# Patient Record
Sex: MALE | Race: WHITE | NOT HISPANIC OR LATINO | Employment: OTHER | URBAN - METROPOLITAN AREA
[De-identification: names, ages, dates, MRNs, and addresses within clinical notes are randomized per-mention and may not be internally consistent; named-entity substitution may affect disease eponyms.]

---

## 2017-01-12 ENCOUNTER — TELEPHONE (OUTPATIENT)
Dept: NEUROLOGY | Facility: CLINIC | Age: 77
End: 2017-01-12

## 2017-01-12 DIAGNOSIS — G40.209 LOCALIZATION-RELATED PARTIAL EPILEPSY WITH COMPLEX PARTIAL SEIZURES (H): Primary | ICD-10-CM

## 2017-01-12 RX ORDER — CLONAZEPAM 0.5 MG/1
TABLET ORAL
Qty: 270 TABLET | Refills: 1 | Status: SHIPPED | OUTPATIENT
Start: 2017-01-12 | End: 2017-08-09

## 2017-02-22 ENCOUNTER — APPOINTMENT (RX ONLY)
Dept: URBAN - METROPOLITAN AREA CLINIC 121 | Facility: CLINIC | Age: 77
Setting detail: DERMATOLOGY
End: 2017-02-22

## 2017-02-22 DIAGNOSIS — Z85.828 PERSONAL HISTORY OF OTHER MALIGNANT NEOPLASM OF SKIN: ICD-10-CM

## 2017-02-22 DIAGNOSIS — Z86.006 PERSONAL HISTORY OF MELANOMA IN-SITU: ICD-10-CM

## 2017-02-22 DIAGNOSIS — D485 NEOPLASM OF UNCERTAIN BEHAVIOR OF SKIN: ICD-10-CM

## 2017-02-22 PROBLEM — I48.91 UNSPECIFIED ATRIAL FIBRILLATION: Status: ACTIVE | Noted: 2017-02-22

## 2017-02-22 PROBLEM — Z85.820 PERSONAL HISTORY OF MALIGNANT MELANOMA OF SKIN: Status: ACTIVE | Noted: 2017-02-22

## 2017-02-22 PROBLEM — G40.909 EPILEPSY, UNSPECIFIED, NOT INTRACTABLE, WITHOUT STATUS EPILEPTICUS: Status: ACTIVE | Noted: 2017-02-22

## 2017-02-22 PROBLEM — L57.0 ACTINIC KERATOSIS: Status: ACTIVE | Noted: 2017-02-22

## 2017-02-22 PROBLEM — E78.5 HYPERLIPIDEMIA, UNSPECIFIED: Status: ACTIVE | Noted: 2017-02-22

## 2017-02-22 PROBLEM — L55.1 SUNBURN OF SECOND DEGREE: Status: ACTIVE | Noted: 2017-02-22

## 2017-02-22 PROBLEM — D48.5 NEOPLASM OF UNCERTAIN BEHAVIOR OF SKIN: Status: ACTIVE | Noted: 2017-02-22

## 2017-02-22 PROCEDURE — ? COUNSELING

## 2017-02-22 PROCEDURE — ? OBSERVATION

## 2017-02-22 PROCEDURE — 11100: CPT

## 2017-02-22 PROCEDURE — ? BIOPSY BY SHAVE METHOD

## 2017-02-22 PROCEDURE — 99203 OFFICE O/P NEW LOW 30 MIN: CPT | Mod: 25

## 2017-02-22 ASSESSMENT — LOCATION DETAILED DESCRIPTION DERM
LOCATION DETAILED: LEFT POSTERIOR SHOULDER
LOCATION DETAILED: RIGHT MEDIAL MALAR CHEEK
LOCATION DETAILED: LEFT SUPERIOR CENTRAL MALAR CHEEK

## 2017-02-22 ASSESSMENT — LOCATION SIMPLE DESCRIPTION DERM
LOCATION SIMPLE: LEFT SHOULDER
LOCATION SIMPLE: LEFT CHEEK
LOCATION SIMPLE: RIGHT CHEEK

## 2017-02-22 ASSESSMENT — LOCATION ZONE DERM
LOCATION ZONE: FACE
LOCATION ZONE: ARM

## 2017-02-22 NOTE — PROCEDURE: OBSERVATION
Size Of Lesion In Cm (Optional): 0
Detail Level: Simple
Body Location Override (Optional - Billing Will Still Be Based On Selected Body Map Location If Applicable): Right side of face

## 2017-02-22 NOTE — HPI: SKIN LESION
How Severe Is Your Skin Lesion?: mild
Has Your Skin Lesion Been Treated?: not been treated
Is This A New Presentation, Or A Follow-Up?: Skin Lesion
Additional History: Melanoma treated 5 yrs ago

## 2017-02-22 NOTE — PROCEDURE: BIOPSY BY SHAVE METHOD
Notification Instructions: Patient will be notified of biopsy results. However, patient instructed to call the office if not contacted within 2 weeks.
Billing Type: [employer]
Hemostasis: Josh's
Biopsy Method: Personna blade
Lab: [facility]
Destruction After The Procedure: No
Electrodesiccation And Curettage Text: The wound bed was treated with electrodesiccation and curettage after the biopsy was performed.
Body Location Override (Optional - Billing Will Still Be Based On Selected Body Map Location If Applicable): Left zygoma
Dressing: bandage
Anesthesia Type: 1% lidocaine with epinephrine and a 1:10 solution of 8.4% sodium bicarbonate
Curettage Text: The wound bed was treated with curettage after the biopsy was performed.
Detail Level: Detailed
Lab Facility: [address] [facility]
Size Of Lesion In Cm: 1.1
Consent: Written consent was obtained and risks were reviewed including but not limited to scarring, infection, bleeding, scabbing, incomplete removal, nerve damage and allergy to anesthesia.
Cryotherapy Text: The wound bed was treated with cryotherapy after the biopsy was performed.
Type Of Destruction Used: Curettage
X Size Of Lesion In Cm: 0
Post-Care Instructions: -I reviewed with the patient in detail post-care instructions. Patient is to keep the pressure dressing dry and in place for 24 hours. Upon removing the bandage, the patient is to begin washing with soap and water and applying Vaseline, bacitracin, or polysporin (NOT neosporin) once to twice a day for 7-10 days or until healed. \\n-Bleeding is rare, but if it should occur apply direct pressure to the bleeding site for a full 15 minutes without easing up. If the bleeding continues despite your efforts, please call the office in which you were seen to speak with a provider. It may be necessary to go to the emergency room. \\n-your biopsy will be submitted to the appropriate lab for analysis and you should receive results within 7-10 business days. If the result is benign you may receive a card in the mail, otherwise we will contact you. \\n-if you have not heard from our office within 3 weeks either by phone or mail, please contact the office.
Wound Care: Vaseline
Render Post-Care Instructions In Note?: yes
Silver Nitrate Text: The wound bed was treated with silver nitrate after the biopsy was performed.
Electrodesiccation Text: The wound bed was treated with electrodesiccation after the biopsy was performed.
Biopsy Type: H and E
Anesthesia Volume In Cc (Will Not Render If 0): 0.3

## 2017-08-09 ENCOUNTER — OFFICE VISIT (OUTPATIENT)
Dept: NEUROLOGY | Facility: CLINIC | Age: 77
End: 2017-08-09

## 2017-08-09 VITALS
WEIGHT: 178 LBS | HEART RATE: 70 BPM | BODY MASS INDEX: 24.92 KG/M2 | DIASTOLIC BLOOD PRESSURE: 82 MMHG | SYSTOLIC BLOOD PRESSURE: 129 MMHG | HEIGHT: 71 IN

## 2017-08-09 DIAGNOSIS — G40.209 PARTIAL EPILEPSY WITH IMPAIRMENT OF CONSCIOUSNESS (H): Primary | ICD-10-CM

## 2017-08-09 DIAGNOSIS — Z79.899 ENCOUNTER FOR LONG-TERM (CURRENT) USE OF MEDICATIONS: ICD-10-CM

## 2017-08-09 DIAGNOSIS — G40.209 LOCALIZATION-RELATED PARTIAL EPILEPSY WITH COMPLEX PARTIAL SEIZURES (H): ICD-10-CM

## 2017-08-09 RX ORDER — TOPIRAMATE 50 MG/1
TABLET, FILM COATED ORAL
Qty: 540 TABLET | Refills: 3 | Status: SHIPPED | OUTPATIENT
Start: 2017-08-09 | End: 2018-07-31

## 2017-08-09 RX ORDER — PROPRANOLOL HYDROCHLORIDE 20 MG/1
10 TABLET ORAL 2 TIMES DAILY
COMMUNITY
Start: 2016-12-22 | End: 2017-12-22

## 2017-08-09 RX ORDER — CLONAZEPAM 0.5 MG/1
TABLET ORAL
Qty: 270 TABLET | Refills: 1 | Status: SHIPPED | OUTPATIENT
Start: 2017-08-09 | End: 2018-02-08

## 2017-08-09 RX ORDER — DIAZEPAM ORAL SOLUTION (CONCENTRATE) 5 MG/ML
SOLUTION ORAL
Qty: 15 ML | Refills: 0 | Status: SHIPPED | OUTPATIENT
Start: 2017-08-09

## 2017-08-09 NOTE — PATIENT INSTRUCTIONS
Tablet Size Number of Tablets/Capsules         AM  (morning)   10 PM (Night)               Lamotrigine  100 mg and 150 mg tablet  250  mg   200 mg            Topiramate   50 mg tablet  150 mg    150 mg            Klonopin 0.5 mg 0.5 mg   1 mg                            Prior to Admission medications    Medication Sig Start Date End Date Taking? Authorizing Provider   propranolol (INDERAL) 20 MG tablet Take 20 mg by mouth 2 times daily 12/22/16 12/22/17 Yes Reported, Patient   clonazePAM (KLONOPIN) 0.5 MG tablet 0.5mg AM and 1 mg PM 1/12/17  Yes Yarelis Cain MD   topiramate (TOPAMAX) 50 MG tablet 150 mg twice a day 7/20/16  Yes Yarelis Cain MD   lamoTRIgine (LAMICTAL) 100 MG tablet Generic.  Take 1 tablet at morning (along with 150 mg) and 2 tablets at 10 pm. 7/20/16  Yes Yarelis Cain MD   lamoTRIgine (LAMICTAL) 150 MG tablet Take 1 tablet (along with 100 mg tablet) by mouth every  morning 7/20/16  Yes Yarelis Cain MD   aspirin 81 MG chewable tablet Take 81 mg by mouth daily.   Yes Reported, Patient   flecainide (TAMBOCOR) 50 MG tablet Take 50 mg by mouth daily.   Yes Reported, Patient   Multiple Vitamin (MULTI-VITAMIN PO) Take by mouth daily    Yes Reported, Patient   simvastatin (ZOCOR) 40 MG tablet Take  by mouth At Bedtime.   Yes Reported, Patient   Cholecalciferol (VITAMIN D PO) Take by mouth daily    Yes Reported, Patient     Diazepam: Take 1ML (5mg) for generalized seizures greater than 3 minutes or cluster of three seizures within 8 hours. May repeat once in 10 minutes of seizures continue. Do not exceed 10 mg in 24 hours.     [facility] 2017 for lamotrigine script.     Yarelis Cain MD

## 2017-08-09 NOTE — MR AVS SNAPSHOT
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 Authorizing Provider   propranolol (INDERAL) 20 MG tablet Take 20 mg by mouth 2 times daily 12/22/16 12/22/17 Yes Reported, Patient   clonazePAM (KLONOPIN) 0.5 MG tablet 0.5mg AM and 1 mg PM 1/12/17  Yes Yarelis Cain MD   topiramate (TOPAMAX) 50 MG tablet 150 mg twice a day 7/20/16  Yes Yarelis Cain MD   lamoTRIgine (LAMICTAL) 100 MG tablet Generic.  Take 1 tablet at morning (along with 150 mg) and 2 tablets at 10 pm. 7/20/16  Yes Yarelis Cain MD   lamoTRIgine (LAMICTAL) 150 MG tablet Take 1 tablet (along with 100 mg tablet) by mouth every  morning 7/20/16  Yes Yarelis Cain MD   aspirin 81 MG chewable tablet Take 81 mg by mouth daily.   Yes Reported, Patient   flecainide (TAMBOCOR) 50 MG tablet Take 50 mg by mouth daily.   Yes Reported, Patient   Multiple Vitamin (MULTI-VITAMIN PO) Take by mouth daily    Yes Reported, Patient   simvastatin (ZOCOR) 40 MG tablet Take  by mouth At Bedtime.   Yes Reported, Patient   Cholecalciferol (VITAMIN D PO) Take by mouth daily    Yes Reported, Patient     Diazepam: Take 1ML (5mg) for generalized seizures greater than 3  minutes or cluster of three seizures within 8 hours. May repeat once in 10 minutes of seizures continue. Do not exceed 10 mg in 24 hours.     [facility] for [facility]ine [facility].     Yarelis Cain MD           Follow-ups after your visit        Additional Services     [facility] Patient Education Referral                 Follow-up notes from your care team     Return in about 1 year (around 2018).      Future tests that were ordered for you today     Open Future Orders        Priority Expected Expires Ordered    Dexa Hip/Pelvis/Spine Routine  2018            Who to contact     Please call your clinic at [phone] to:    Ask questions about your health    Make or cancel appointments    Discuss your medicines    Learn about your test results    Speak to your doctor   If you have compliments or concerns about an experience at your clinic, or if you wish to file a complaint, please contact [facility] Patient Relations at [phone] or email us at [email or web address]         Additional Information About Your Visit        [employer] Information     [employer] is an electronic gateway that provides easy, online access to your medical records. With [employer], you can request a clinic appointment, read your test results, renew a prescription or communicate with your care team.     To sign up for [employer] visit the website at [email or web address]   You will be asked to enter the access code listed below, as well as some personal information. Please follow the directions to create your username and password.     Your access code is: [email or web address]  Expires: 2017  4:03 PM     Your access code will  in 90 days. If you need help or a new code, please contact your [facility] Clinic or call [phone] for assistance.        Care EveryWhere ID     This is your Care EveryWhere ID. This could be used by other  "organizations to access your [place] medical records  [id number]        Your Vitals Were     Pulse Height BMI (Body Mass Index)             70 5' 11\" (180.3 cm) 24.83 kg/m2          Blood Pressure from Last 3 Encounters:   08/09/17 129/82   07/20/16 125/68   07/02/15 137/67    Weight from Last 3 Encounters:   08/09/17 178 lb (80.7 kg)   07/20/16 175 lb 3.2 oz (79.5 kg)   07/02/15 181 lb 6.4 oz (82.3 kg)              We Performed the Following     [facility] Patient Education Referral          Today's Medication Changes          These changes are accurate as of: 8/9/17  4:03 PM.  If you have any questions, ask your nurse or doctor.               Start taking these medicines.        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 May repeat once in 10 minutes of seizures continue. Do not exceed 10 mg in 24 hours.   Quantity:  15 mL   Refills:  0         These medicines have changed or have updated prescriptions.        Dose/Directions    topiramate 50 MG tablet   Commonly known as:  TOPAMAX   This may have changed:  Another medication with the same name was removed. Continue taking this medication, and follow the directions you see here.   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 Please contact your care team if you have questions.     metoprolol 25 MG tablet   Commonly known as:  LOPRESSOR   Stopped by:  Yarelis Cain MD           omeprazole 20 MG CR capsule   Commonly known as:  priLOSEC   Stopped by:  Yarelis Cain MD                Where to get your medicines "      These medications were sent to [facility] - [place], [place] - [address]  [address] [address]     Phone:  [phone]     topiramate 50 MG tablet         Some of these will need a paper prescription and others can be bought over the counter.  Ask your nurse if you have questions.     Bring a paper prescription for each of these medications     clonazePAM 0.5 MG tablet    diazepam 5 MG/ML (HIGH CONC) solution                Primary Care Provider Office Phone # Fax #    [facility] [phone] [address] [facility] [address] [facility] [address]        Equal Access to Services     MAVIS PINO : Hadii belle villanueva hadasho Sopauloali, waaxda luqadaha, qaybta kaalmada adeegyada, kyle cheung. So [facility] [phone].    ATENCIÓN: Si habla español, tiene a parker disposición servicios gratuitos de asistencia lingüística. [facility] [phone].    We comply with applicable federal civil rights laws and [place] laws. We do not discriminate on the basis of race, color, national origin, age, disability sex, sexual orientation or gender identity.            Thank you!     Thank you for choosing [facility]  for your care. Our goal is always to provide you with excellent care. Hearing back from our patients is one way we can continue to improve our services. Please take a few minutes to complete the written survey that you may receive in the mail after your visit with us. Thank you!             Your Updated Medication List - Protect others around you: Learn how to safely use, store and throw away your medicines at www.disposemymeds.org.          This list is accurate as of: 8/9/17  4:03 PM.  Always use your most recent med list.                   Brand Name Dispense Instructions for use Diagnosis    aspirin 81 MG chewable tablet      Take 81 mg by mouth daily.        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 May repeat once in 10 minutes of seizures continue. Do not exceed 10 mg in 24 hours.    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        * lamoTRIgine 100 MG tablet    LaMICtal    270 tablet    Generic.  Take 1 tablet at morning (along with 150 mg) and 2 tablets at 10 pm.    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 Read the directions carefully, and ask your doctor or other care provider to review them with you.

## 2017-08-09 NOTE — LETTER
2017     RE: Ronny Rinaldi  : 1940   MRN: [id number]      Dear Colleague,    Thank you for referring your patient, Ronny Rinaldi, to the [facility] at [facility]. Please see a copy of my visit note below.    [facility]    Patient:  Ronny Rinaldi  :  1940   Age:  77 year old   Today's Office Visit:  2017    Epilepsy Data:  Patient History  Primary Epileptologist/Provider: Yarelis Cain M.D.  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   Tests/Surgery History  Last EE01  Last MRI: 97  Last Neuropsych Testin97  Seizure Record  Current Visit Date: 17  Previous Visit Date: 16  Months since last visit: 12.65  Seizure Type 1: Simple partial seizures unspecified  Description of Sz Type 1: fear and anxiety with no loss of awareness  # of Type 1 Seizure since last visit: 0  Freq. Type 1 / Month: 0  Seizure Type 2: Complex partial seizures unspecified  Description of Sz Type 2: anxiety, fear, psychic aura, followed by right arm clonic and face clonic, progresses to loss of awareness and whole body convulsion   # of Type 2 Seizure since last visit: 0  Freq. Type 2 / Month: 0          Epilepsy Data:  Copied forward: Right-handed gentleman with a history of seizures secondary to left frontal abscess in . Onset of seizures in . 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 He underwent a hue hole with drainage of the abscess followed by antibiotic therapy. Six months after the abscess was diagnosed he began having seizures. He had simple partial progressing to complex partial seizures that frequently secondarily generalized. He  was started on medications and had good seizure control up until 1999. Subsequently he had been doing well on polytherapy with lamotrigine, Topamax and Klonopin. The Klonopin was mostly added for the management of his anxiety.  In 2005 klonopin was decreased from 1.5 mg to 1mg and he had a breakthrough seizure. He has been seizure free since 11/2005 on his current combination of Topamax, Lamictal and Klonopin.   His last EEG was in 2001 and was notable for sharp waves in the left frontal and right temporal regions. EEGs prior to that back in 1997 showed some seizures arising from the left hemisphere and associated with clinical events of being unresponsive. His last MRI of the brain was in 1997 and shows left frontal areas of encephalomalacia, focal atrophic changes in the left frontal region, and small area of the right lateral frontal signal changes consistent with encephalomalacia.     History of Present Illness:   The patient returns today for 1 year followup. He came with his wife. H has remained seizure free since 2005. He is very happy with his current right AED regimen. He had 2 ER visits May 2016 due to bradyarrthymia and required pace maker placement. Then July 2016 he fell and hit his head, he had loss of consciousness. He developed a SDH and had hue hole and removed the hematoma.   Currently, on antiepileptic drug there is no double vision, no mood changes, no nausea, no vomiting, no abdominal pain, no rashes.     Prior to Admission medications    Medication Sig Start Date End Date Taking? Authorizing Provider   propranolol (INDERAL) 20 MG tablet Take 20 mg by mouth 2 times daily 12/22/16 12/22/17 Yes Reported, Patient   clonazePAM (KLONOPIN) 0.5 MG tablet 0.5mg AM and 1 mg PM 1/12/17  Yes Yarelis Cain MD   topiramate (TOPAMAX) 50 MG tablet 150 mg twice a day 7/20/16  Yes Yarelis Cain MD   lamoTRIgine (LAMICTAL) 100 MG tablet Generic.  Take 1 tablet at morning (along with 150 mg) and 2 tablets at 10  "pm. 7/20/16  Yes Yarelis Cain MD   lamoTRIgine (LAMICTAL) 150 MG tablet Take 1 tablet (along with 100 mg tablet) by mouth every  morning 7/20/16  Yes Yarelis Cain MD   aspirin 81 MG chewable tablet Take 81 mg by mouth daily.   Yes Reported, Patient   flecainide (TAMBOCOR) 50 MG tablet Take 50 mg by mouth daily.   Yes Reported, Patient   Multiple Vitamin (MULTI-VITAMIN PO) Take by mouth daily    Yes Reported, Patient   simvastatin (ZOCOR) 40 MG tablet Take  by mouth At Bedtime.   Yes Reported, Patient   Cholecalciferol (VITAMIN D PO) Take by mouth daily    Yes Reported, Patient       AED:      Tablet Size Number of Tablets/Capsules         AM  (morning)   10 PM (Night)               Lamotrigine  100 mg and 150 mg tablet  250  mg   200 mg            Topiramate   50 mg tablet  150 mg    150 mg            Klonopin 0.5 mg 0.5 mg   1 mg                            Medication Notes:  Decrease in Klonopin 9/2005 from 1.5 mg to 1mg resulted in seizure in 11/2005. He was increased back to Klonopin 1.5 mg and has remained seizure free. AED Medication Compliance:  compliant most of the time. Using a pill box:  Yes    Review of Systems:  Lethargy / Tiredness:  Yes  Nausea / Vomiting:  No  Double Vision:  No  Sleepiness:  Sleep is fine, but usually wakes up at 4am  Depression:  No  Slowed Cognitive Function:  No  Memory Problems:  No  Poor Balance:  He has noticed bad balance, no falls  Dizziness:  No  Sleep Changes:  No  Have you experienced a traumatic fall since your last visit: Yes  Are these falls related to your seizures: NO  Motor: Tremor in right hand    Other Issues:  Lives with wife. He is driving. Is patient safe to drive:  Yes. 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     Exam:    /82  Pulse 70  Ht 5' 11\" (180.3 cm)  Wt 178 lb (80.7 kg)  BMI 24.83 kg/m2     Wt Readings from Last 5 Encounters:   08/09/17 178 lb (80.7 kg)   07/20/16 175 lb 3.2 oz (79.5 kg)   07/02/15 181 lb 6.4 oz (82.3 kg) "   06/13/14 177 lb (80.3 kg)   05/29/13 182 lb 3.2 oz (82.6 kg)       Assessment and Plan:   1. Localization-related epilepsy, controlled, secondary to history of left frontal lobe abscess. The patient has been on polytherapy with Topamax, lamotrigine and Klonopin. Seizure free since 11/2005. We will continue same antiepileptic drug with no changes. He had a SDH in 7/2016 from a fall. Wife is concerned about seizures and would like a rescue medication. I prescribed valium.   2. Tremors. Stable. Most likely due to lamotrigine.   3. 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   4. Bone Health: Ordered DEXA scan since he has a long history of antiepileptic drugs use.   PLAN:   1. Continue antiepileptic drug .  2. Diazepam: Take 1ML (5mg) for generalized seizures greater than 3 minutes or cluster of three seizures within 8 hours. May repeat once in 10 minutes of seizures continue. Do not exceed 10 mg in 24 hours.   3. Call [facility] for lamotrigine November 2017 for lamotrigine script.   4. [facility] nurse education for generalized tonic-clonic convulsion safety  5. Follow up in 1 year.   6. Ordered DEXA    As described above, I met with the patient for 35 minutes and during this time counseling was greater than 50% of the visit time.      Yarelis Cain MD

## 2017-08-09 NOTE — PROGRESS NOTES
[facility] Progress Note    Patient:  Ronny Rinaldi  :  1940   Age:  77 year old   Today's Office Visit:  2017    Epilepsy Data:  Patient History  Primary Epileptologist/Provider: Yarelis Cain M.D.  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   Tests/Surgery History  Last EE01  Last MRI: 97  Last Neuropsych Testin97  Seizure Record  Current Visit Date: 17  Previous Visit Date: 16  Months since last visit: 12.65  Seizure Type 1: Simple partial seizures unspecified  Description of Sz Type 1: fear and anxiety with no loss of awareness  # of Type 1 Seizure since last visit: 0  Freq. Type 1 / Month: 0  Seizure Type 2: Complex partial seizures unspecified  Description of Sz Type 2: anxiety, fear, psychic aura, followed by right arm clonic and face clonic, progresses to loss of awareness and whole body convulsion   # of Type 2 Seizure since last visit: 0  Freq. Type 2 / Month: 0          Epilepsy Data:  Copied forward: Right-handed gentleman with a history of seizures secondary to left frontal abscess in . Onset of seizures in . 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 He underwent a hue hole with drainage of the abscess followed by antibiotic therapy. Six months after the abscess was diagnosed he began having seizures. He had simple partial progressing to complex partial seizures that frequently secondarily generalized. He was started on medications and had good seizure control up until . Subsequently he had been doing well on polytherapy with lamotrigine, Topamax and Klonopin. The Klonopin was mostly added for the management of his anxiety.  In  klonopin was decreased from 1.5 mg  to 1mg and he had a breakthrough seizure. He has been seizure free since 11/2005 on his current combination of Topamax, Lamictal and Klonopin.   His last EEG was in 2001 and was notable for sharp waves in the left frontal and right temporal regions. EEGs prior to that back in 1997 showed some seizures arising from the left hemisphere and associated with clinical events of being unresponsive. His last MRI of the brain was in 1997 and shows left frontal areas of encephalomalacia, focal atrophic changes in the left frontal region, and small area of the right lateral frontal signal changes consistent with encephalomalacia.     History of Present Illness:   The patient returns today for 1 year followup. He came with his wife. H has remained seizure free since 2005. He is very happy with his current right AED regimen. He had 2 ER visits May 2016 due to bradyarrthymia and required pace maker placement. Then July 2016 he fell and hit his head, he had loss of consciousness. He developed a SDH and had hue hole and removed the hematoma.   Currently, on antiepileptic drug there is no double vision, no mood changes, no nausea, no vomiting, no abdominal pain, no rashes.     Prior to Admission medications    Medication Sig Start Date End Date Taking? Authorizing Provider   propranolol (INDERAL) 20 MG tablet Take 20 mg by mouth 2 times daily 12/22/16 12/22/17 Yes Reported, Patient   clonazePAM (KLONOPIN) 0.5 MG tablet 0.5mg AM and 1 mg PM 1/12/17  Yes Yarelis Cain MD   topiramate (TOPAMAX) 50 MG tablet 150 mg twice a day 7/20/16  Yes Yarelis Cain MD   lamoTRIgine (LAMICTAL) 100 MG tablet Generic.  Take 1 tablet at morning (along with 150 mg) and 2 tablets at 10 pm. 7/20/16  Yes Yarelis Cain MD   lamoTRIgine (LAMICTAL) 150 MG tablet Take 1 tablet (along with 100 mg tablet) by mouth every  morning 7/20/16  Yes Yarelis Cain MD   aspirin 81 MG chewable tablet Take 81 mg by mouth daily.   Yes Reported, Patient   flecainide  "(TAMBOCOR) 50 MG tablet Take 50 mg by mouth daily.   Yes Reported, Patient   Multiple Vitamin (MULTI-VITAMIN PO) Take by mouth daily    Yes Reported, Patient   simvastatin (ZOCOR) 40 MG tablet Take  by mouth At Bedtime.   Yes Reported, Patient   Cholecalciferol (VITAMIN D PO) Take by mouth daily    Yes Reported, Patient       AED:      Tablet Size Number of Tablets/Capsules         AM  (morning)   10 PM (Night)               Lamotrigine  100 mg and 150 mg tablet  250  mg   200 mg            Topiramate   50 mg tablet  150 mg    150 mg            Klonopin 0.5 mg 0.5 mg   1 mg                            Medication Notes:  Decrease in Klonopin 9/2005 from 1.5 mg to 1mg resulted in seizure in 11/2005. He was increased back to Klonopin 1.5 mg and has remained seizure free. AED Medication Compliance:  compliant most of the time. Using a pill box:  Yes    Review of Systems:  Lethargy / Tiredness:  Yes  Nausea / Vomiting:  No  Double Vision:  No  Sleepiness:  Sleep is fine, but usually wakes up at 4am  Depression:  No  Slowed Cognitive Function:  No  Memory Problems:  No  Poor Balance:  He has noticed bad balance, no falls  Dizziness:  No  Sleep Changes:  No  Have you experienced a traumatic fall since your last visit: Yes  Are these falls related to your seizures: NO  Motor: Tremor in right hand    Other Issues:  Lives with wife. He is driving. Is patient safe to drive:  Yes. 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     Exam:    /82  Pulse 70  Ht 5' 11\" (180.3 cm)  Wt 178 lb (80.7 kg)  BMI 24.83 kg/m2     Wt Readings from Last 5 Encounters:   08/09/17 178 lb (80.7 kg)   07/20/16 175 lb 3.2 oz (79.5 kg)   07/02/15 181 lb 6.4 oz (82.3 kg)   06/13/14 177 lb (80.3 kg)   05/29/13 182 lb 3.2 oz (82.6 kg)       Assessment and Plan:   1. Localization-related epilepsy, controlled, secondary to history of left frontal lobe abscess. The patient has been on polytherapy with Topamax, lamotrigine and Klonopin. Seizure " free since 11/2005. We will continue same antiepileptic drug with no changes. He had a SDH in 7/2016 from a fall. Wife is concerned about seizures and would like a rescue medication. I prescribed valium.   2. Tremors. Stable. Most likely due to lamotrigine.   3. 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   4. Bone Health: Ordered DEXA scan since he has a long history of antiepileptic drugs use.   PLAN:   1. Continue antiepileptic drug .  2. Diazepam: Take 1ML (5mg) for generalized seizures greater than 3 minutes or cluster of three seizures within 8 hours. May repeat once in 10 minutes of seizures continue. Do not exceed 10 mg in 24 hours.   3. Call [facility] for lamotrigine November 2017 for lamotrigine script.   4. [facility] education for generalized tonic-clonic convulsion safety  5. Follow up in 1 year.   6. Ordered DEXA      As described above, I met with the patient for 35 minutes and during this time counseling was greater than 50% of the visit time.      Yarelis Cain MD

## 2017-08-17 ENCOUNTER — TELEPHONE (OUTPATIENT)
Dept: NEUROLOGY | Facility: CLINIC | Age: 77
End: 2017-08-17

## 2017-08-17 NOTE — TELEPHONE ENCOUNTER
Prior Authorization Retail Medication Request  Medication/Dose: diazepam (DIAZEPAM INTENSOL) 5 MG/ML  Diagnosis and ICD code: Partial epilepsy with impairment of consciousness (H) [G40.209]  New/Renewal/Insurance Change PA: NEW  Previously Tried and Failed Therapies: Tegretol, Dilantin, Depakote,     Current:Diazeam, Clonazepam, Topiramate, Lamotrigine      Insurance ID (if provided):   Insurance Phone (if provided): [phone]    Any additional info from fax request: Received call from [employer]. Medication needs PA. Pt uses diazepam as a rescue med.     If you received a fax notification from an outside Pharmacy:  Pharmacy Name: [facility]  Pharmacy #: [phone]  Pharmacy Fax: [phone]

## 2017-08-23 NOTE — TELEPHONE ENCOUNTER
[facility] Prior Authorization Team   Phone: [phone]  Fax: [phone]    Prior Authorization Approval    Authorization Effective Date:    Authorization Expiration Date:    Medication: diazepam (DIAZEPAM INTENSOL) 5 MG/ML  Approved Dose/Quantity: 15 ml  Reference #: [id number]   Insurance Company: [employer] - Phone [phone] Fax [phone]  Expected CoPay: $12     CoPay Card Available:      Foundation Assistance Needed:    Which Pharmacy is filling the prescription (Not needed for infusion/clinic administered): [facility] - [address]  Pharmacy Notified: Yes  Patient Notified: YesComment:  Left Voicemail    Prior Auth submitted via the phone, approval letter will be faxed and we should be receiving it shortly.

## 2017-09-20 ENCOUNTER — TELEPHONE (OUTPATIENT)
Dept: NEUROLOGY | Facility: CLINIC | Age: 77
End: 2017-09-20

## 2017-09-20 DIAGNOSIS — G40.209 LOCALIZATION-RELATED PARTIAL EPILEPSY WITH COMPLEX PARTIAL SEIZURES (H): ICD-10-CM

## 2017-09-20 RX ORDER — LAMOTRIGINE 150 MG/1
TABLET ORAL
Qty: 90 TABLET | Refills: 3 | Status: SHIPPED | OUTPATIENT
Start: 2017-09-20 | End: 2018-07-31

## 2017-09-20 RX ORDER — LAMOTRIGINE 100 MG/1
TABLET ORAL
Qty: 270 TABLET | Refills: 3 | Status: SHIPPED | OUTPATIENT
Start: 2017-09-20 | End: 2018-07-31

## 2017-09-20 NOTE — TELEPHONE ENCOUNTER
----- Message from Chela Rodriguez sent at 9/19/2017  2:12 PM CDT -----  Patient needs a refill on lamoTRIgine (LAMICTAL) 100 MG tablet, taking Generic.  Take 1 tablet at morning (along with 150 mg) and 2 tablets at 10 pm.. Needs 30 days supply with refills.    Patient needs a refill on lamoTRIgine (LAMICTAL) 150 MG tablet, taking Take 1 tablet (along with 100 mg tablet) by mouth every  morning. Needs 30 days supply with refills.    Pharmacy: [facility]    RT date: 6/2018    Thanks, Chela

## 2018-02-08 ENCOUNTER — TELEPHONE (OUTPATIENT)
Dept: NEUROLOGY | Facility: CLINIC | Age: 78
End: 2018-02-08

## 2018-02-08 DIAGNOSIS — G40.209 LOCALIZATION-RELATED PARTIAL EPILEPSY WITH COMPLEX PARTIAL SEIZURES (H): ICD-10-CM

## 2018-02-08 RX ORDER — CLONAZEPAM 0.5 MG/1
TABLET ORAL
Qty: 270 TABLET | Refills: 1 | Status: SHIPPED | OUTPATIENT
Start: 2018-02-08 | End: 2018-07-31

## 2018-02-08 NOTE — TELEPHONE ENCOUNTER
----- Message from Kermit Courtney CMA sent at 2/8/2018  2:59 PM CST -----  Regarding: refill  Patient needs a refill on clonazePAM (KLONOPIN) 0.5 MG tablet, taking 0.5mg AM and 1 mg PM. Needs 90 days supply with refills.    Pharmacy:    [facility] - [address]    RTC date: 6/2018

## 2018-02-19 ENCOUNTER — TELEPHONE (OUTPATIENT)
Dept: NEUROLOGY | Facility: CLINIC | Age: 78
End: 2018-02-19

## 2018-02-19 NOTE — TELEPHONE ENCOUNTER
Prior Authorization Retail Medication Request  Medication/Dose: clonazePAM (KLONOPIN) 0.5 MG tablet  Diagnosis and ICD code: Localization-related partial epilepsy with complex partial seizures (H) [G40.209]   New/Renewal/Insurance Change PA:  New  Previously Tried and Failed Therapies: see chart    Insurance ID (if provided):   Insurance Phone (if provided):     Any additional info from fax request:     If you received a fax notification from an outside Pharmacy:  Pharmacy Name: [email or web address]  Pharmacy # :[phone]  Pharmacy Fax: [phone]

## 2018-02-20 NOTE — TELEPHONE ENCOUNTER
P/A not needed. Is covered by plan. I called the pharmacy to notify them, and they had already shipped this out to the patient.

## 2018-06-18 DIAGNOSIS — G40.209 LOCALIZATION-RELATED PARTIAL EPILEPSY WITH COMPLEX PARTIAL SEIZURES (H): ICD-10-CM

## 2018-06-19 RX ORDER — TOPIRAMATE 50 MG/1
TABLET, FILM COATED ORAL
Qty: 540 TABLET | Refills: 3 | OUTPATIENT
Start: 2018-06-19

## 2018-07-31 ENCOUNTER — OFFICE VISIT (OUTPATIENT)
Dept: NEUROLOGY | Facility: CLINIC | Age: 78
End: 2018-07-31
Payer: COMMERCIAL

## 2018-07-31 VITALS
TEMPERATURE: 98.4 F | HEART RATE: 87 BPM | BODY MASS INDEX: 25.52 KG/M2 | DIASTOLIC BLOOD PRESSURE: 64 MMHG | WEIGHT: 183 LBS | SYSTOLIC BLOOD PRESSURE: 92 MMHG | RESPIRATION RATE: 16 BRPM

## 2018-07-31 DIAGNOSIS — G40.209 LOCALIZATION-RELATED PARTIAL EPILEPSY WITH COMPLEX PARTIAL SEIZURES (H): ICD-10-CM

## 2018-07-31 DIAGNOSIS — G40.209 PARTIAL EPILEPSY WITH IMPAIRMENT OF CONSCIOUSNESS (H): Primary | ICD-10-CM

## 2018-07-31 DIAGNOSIS — R20.9 DISTURBANCE OF SKIN SENSATION: ICD-10-CM

## 2018-07-31 RX ORDER — CLONAZEPAM 0.5 MG/1
TABLET ORAL
Qty: 270 TABLET | Refills: 1 | Status: SHIPPED | OUTPATIENT
Start: 2018-07-31 | End: 2019-05-23

## 2018-07-31 RX ORDER — TOPIRAMATE 50 MG/1
TABLET, FILM COATED ORAL
Qty: 540 TABLET | Refills: 3 | Status: SHIPPED | OUTPATIENT
Start: 2018-07-31 | End: 2019-05-23

## 2018-07-31 RX ORDER — LAMOTRIGINE 150 MG/1
TABLET ORAL
Qty: 90 TABLET | Refills: 3 | Status: SHIPPED | OUTPATIENT
Start: 2018-07-31 | End: 2019-05-23

## 2018-07-31 RX ORDER — LAMOTRIGINE 100 MG/1
TABLET ORAL
Qty: 270 TABLET | Refills: 3 | Status: SHIPPED | OUTPATIENT
Start: 2018-07-31 | End: 2019-05-23

## 2018-07-31 ASSESSMENT — PAIN SCALES - GENERAL: PAINLEVEL: NO PAIN (0)

## 2018-07-31 NOTE — PROGRESS NOTES
[facility] Progress Note    Patient:  Ronny Rinaldi  :  1940   Age:  78 year old   Today's Office Visit:  2018    Epilepsy Data:  Patient History  Primary Epileptologist/Provider: Yarelis Cain M.D.  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   Tests/Surgery History  Last EE01  Last MRI: 97  Last Neuropsych Testin97  Seizure Record  Current Visit Date: 18  Previous Visit Date: 17  Months since last visit: 11.7  Seizure Type 1: Simple partial seizures unspecified  Description of Sz Type 1: fear and anxiety with no loss of awareness  # of Type 1 Seizure since last visit: 0  Freq. Type 1 / Month: 0  Seizure Type 2: Complex partial seizures unspecified  Description of Sz Type 2: anxiety, fear, psychic aura, followed by right arm clonic and face clonic, progresses to loss of awareness and whole body convulsion   # of Type 2 Seizure since last visit: 0  Freq. Type 2 / Month: 0               Epilepsy Data:  Copied forward: Right-handed gentleman with a history of seizures secondary to left frontal abscess in . Onset of seizures in . 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 He underwent a hue hole with drainage of the abscess followed by antibiotic therapy. Six months after the abscess was diagnosed he began having seizures. He had simple partial progressing to complex partial seizures that frequently secondarily generalized. He was started on medications and had good seizure control up until . Subsequently he had been doing well on polytherapy with lamotrigine, Topamax and Klonopin. The Klonopin was mostly added for the management of his anxiety.  In  klonopin was decreased from 1.5  mg to 1mg and he had a breakthrough seizure. He has been seizure free since 11/2005 on his current combination of Topamax, Lamictal and Klonopin.   His last EEG was in 2001 and was notable for sharp waves in the left frontal and right temporal regions. EEGs prior to that back in 1997 showed some seizures arising from the left hemisphere and associated with clinical events of being unresponsive. His last MRI of the brain was in 1997 and shows left frontal areas of encephalomalacia, focal atrophic changes in the left frontal region, and small area of the right lateral frontal signal changes consistent with encephalomalacia.     History of Present Illness:   The patient returns today for 1 year followup. He came without his wife. He has remained seizure free since 2005.  On today's visit we spoke about falls.  Patient states since he has had a pacemaker placed he has not had many episodes of dizziness and syncope.  This has reduced the number of falls.  Recently has falls are attributed to tripping on the rug or not paying attention to lifting his legs.  I asked him if he would like to have an occupational therapist review home safety in relationship to his falls.  He declined.  Additionally patient does state that he has numbness on his feet and I suggested that he may have a neuropathy and recommended metabolic lab workup and he declined this also.  Lastly I did raise concerns that if he has recurrent falls he may fracture his bones with his falls and recommended that he get a DEXA scan to evaluate for osteoporosis and if intervention is needed.  He declined DEXA scan.  Patient states that he will try to focus on where he is stepping and holding onto railings when he is climbing stairs to prevent falls.  In regards to his seizure medications he does not want a lower lamotrigine his last lamotrigine level was under 10.  Certainly lamotrigine at times may cause incoordination and ataxia and increased risk for falls in  elderly patients.  I recommended that we may lower lamotrigine to 200 mg twice a day and he declined.  He is worried that he will have breakthrough seizures.  At this time we will continue same seizure medications with no changes. Currently, on antiepileptic drug there is no double vision, no mood changes, no nausea, no vomiting, no abdominal pain, no rashes.     Prior to Admission medications    Medication Sig Start Date End Date Taking? Authorizing Provider   propranolol (INDERAL) 20 MG tablet Take 20 mg by mouth 2 times daily 12/22/16 12/22/17 Yes Reported, Patient   clonazePAM (KLONOPIN) 0.5 MG tablet 0.5mg AM and 1 mg PM 1/12/17  Yes Yarelis Cain MD   topiramate (TOPAMAX) 50 MG tablet 150 mg twice a day 7/20/16  Yes Yarelis Cain MD   lamoTRIgine (LAMICTAL) 100 MG tablet Generic.  Take 1 tablet at morning (along with 150 mg) and 2 tablets at 10 pm. 7/20/16  Yes Yarelis Cain MD   lamoTRIgine (LAMICTAL) 150 MG tablet Take 1 tablet (along with 100 mg tablet) by mouth every  morning 7/20/16  Yes Yarelis Cain MD   aspirin 81 MG chewable tablet Take 81 mg by mouth daily.   Yes Reported, Patient   flecainide (TAMBOCOR) 50 MG tablet Take 50 mg by mouth daily.   Yes Reported, Patient   Multiple Vitamin (MULTI-VITAMIN PO) Take by mouth daily    Yes Reported, Patient   simvastatin (ZOCOR) 40 MG tablet Take  by mouth At Bedtime.   Yes Reported, Patient   Cholecalciferol (VITAMIN D PO) Take by mouth daily    Yes Reported, Patient       AED:      Tablet Size Number of Tablets/Capsules         AM  (morning)   10 PM (Night)               Lamotrigine  100 mg and 150 mg tablet  250  mg   200 mg            Topiramate   50 mg tablet  150 mg    150 mg            Klonopin 0.5 mg 0.5 mg   1 mg                            Medication Notes:  Decrease in Klonopin 9/2005 from 1.5 mg to 1mg resulted in seizure in 11/2005. He was increased back to Klonopin 1.5 mg and has remained seizure free. AED Medication Compliance:  compliant  most of the time. Using a pill box:  Yes    Review of Systems:  Lethargy / Tiredness:  Yes  Nausea / Vomiting:  No  Double Vision:  No  Sleepiness:  Sleep is fine, but usually wakes up at 4am  Depression:  No  Slowed Cognitive Function:  No  Memory Problems:  No  Poor Balance:  He has noticed bad balance, several falls  Dizziness:  No  Sleep Changes:  No  Have you experienced a traumatic fall since your last visit: Yes  Are these falls related to your seizures: NO  Motor: Tremor in right hand    Other Issues:  Lives with wife. He is driving. Is patient safe to drive:  Yes. 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     Exam:    BP 92/64 (BP Location: Right arm, Patient Position: Chair, Cuff Size: Adult Regular)  Pulse 87  Temp 98.4  F (36.9  C)  Resp 16  Wt 83 kg (183 lb)  BMI 25.52 kg/m2     Wt Readings from Last 5 Encounters:   07/31/18 83 kg (183 lb)   08/09/17 80.7 kg (178 lb)   07/20/16 79.5 kg (175 lb 3.2 oz)   07/02/15 82.3 kg (181 lb 6.4 oz)   06/13/14 80.3 kg (177 lb)       Assessment and Plan:   1. Localization-related epilepsy, controlled, secondary to history of left frontal lobe abscess. The patient has been on polytherapy with Topamax, lamotrigine and Klonopin. Seizure free since 11/2005. We will continue same antiepileptic drug with no changes. He had a SDH in 7/2016 from a fall. We did discuss if his falls are related to incoordination or ataxia from lamotrigine.  Patient does not want to change his seizure medications at all because he is worried he may have breakthrough seizures.    2. Falls: I suspect his falls are secondary to mechanical falls and possibly his neuropathy.  On neurological exam today he was able to tandem gait and his gait was stable.  He does have numbness in his feet.  He declined a metabolic workup to understand the etiology for his neuropathy.  I offered multiple strategies for fall prevention and patient essentially said no to the majority of these interventions.   He states that he will focus on holding the rails when he is walking up the stairs and carefully stepping around the rugs.  He typically trips over the rugs in his house.   3. Tremors. Stable. Most likely due to lamotrigine.   4. 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  Since placement of pacemaker.  He has not had syncope related to cardiac arrhythmias.  5. Bone Health: Ordered DEXA scan since he has a long history of antiepileptic drugs use.  Patient did not complete DEXA scan and declined scan.     PLAN:   1. Continue antiepileptic drug .     Tablet Size Number of Tablets/Capsules         AM  (morning)   10 PM (Night)               Lamotrigine  100 mg and 150 mg tablet  250  mg   200 mg            Topiramate   50 mg tablet  150 mg    150 mg            Klonopin 0.5 mg 0.5 mg   1 mg                            2. Follow up in 1 year.     As described above, I met with the patient for 40 minutes and during this time counseling was greater than 50% of the visit time.      Yarelis Cain MD

## 2018-07-31 NOTE — LETTER
2018       RE: Ronny Rinaldi  : 1940   MRN: [id number]      Dear Colleague,    Thank you for referring your patient, Ronny Rinaldi, to the [facility] EPILEPSY CARE at [facility]. Please see a copy of my visit note below.    [facility] [place] [place] Epilepsy [facility] Progress Note    Patient:  Ronny Rinaldi  :  1940   Age:  78 year old   Today's Office Visit:  2018    Epilepsy Data:  Patient History  Primary Epileptologist/Provider: Yarelis Cain M.D.  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   Tests/Surgery History  Last EE01  Last MRI: 97  Last Neuropsych Testin97  Seizure Record  Current Visit Date: 18  Previous Visit Date: 17  Months since last visit: 11.7  Seizure Type 1: Simple partial seizures unspecified  Description of Sz Type 1: fear and anxiety with no loss of awareness  # of Type 1 Seizure since last visit: 0  Freq. Type 1 / Month: 0  Seizure Type 2: Complex partial seizures unspecified  Description of Sz Type 2: anxiety, fear, psychic aura, followed by right arm clonic and face clonic, progresses to loss of awareness and whole body convulsion   # of Type 2 Seizure since last visit: 0  Freq. Type 2 / Month: 0               Epilepsy Data:  Copied forward: Right-handed gentleman with a history of seizures secondary to left frontal abscess in . Onset of seizures in . 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 He underwent a hue hole with drainage of the abscess followed by antibiotic therapy. Six months after the abscess was diagnosed he began having seizures. He had simple partial progressing to complex partial seizures that frequently secondarily  generalized. He was started on medications and had good seizure control up until 1999. Subsequently he had been doing well on polytherapy with lamotrigine, Topamax and Klonopin. The Klonopin was mostly added for the management of his anxiety.  In 2005 klonopin was decreased from 1.5 mg to 1mg and he had a breakthrough seizure. He has been seizure free since 11/2005 on his current combination of Topamax, Lamictal and Klonopin.   His last EEG was in 2001 and was notable for sharp waves in the left frontal and right temporal regions. EEGs prior to that back in 1997 showed some seizures arising from the left hemisphere and associated with clinical events of being unresponsive. His last MRI of the brain was in 1997 and shows left frontal areas of encephalomalacia, focal atrophic changes in the left frontal region, and small area of the right lateral frontal signal changes consistent with encephalomalacia.     History of Present Illness:   The patient returns today for 1 year followup. He came without his wife. He has remained seizure free since 2005.  On today's visit we spoke about falls.  Patient states since he has had a pacemaker placed he has not had many episodes of dizziness and syncope.  This has reduced the number of falls.  Recently has falls are attributed to tripping on the rug or not paying attention to lifting his legs.  I asked him if he would like to have an occupational therapist review home safety in relationship to his falls.  He declined.  Additionally patient does state that he has numbness on his feet and I suggested that he may have a neuropathy and recommended metabolic lab workup and he declined this also.  Lastly I did raise concerns that if he has recurrent falls he may fracture his bones with his falls and recommended that he get a DEXA scan to evaluate for osteoporosis and if intervention is needed.  He declined DEXA scan.  Patient states that he will try to focus on where he is stepping and  holding onto railings when he is climbing stairs to prevent falls.  In regards to his seizure medications he does not want a lower lamotrigine his last lamotrigine level was under 10.  Certainly lamotrigine at times may cause incoordination and ataxia and increased risk for falls in elderly patients.  I recommended that we may lower lamotrigine to 200 mg twice a day and he declined.  He is worried that he will have breakthrough seizures.  At this time we will continue same seizure medications with no changes. Currently, on antiepileptic drug there is no double vision, no mood changes, no nausea, no vomiting, no abdominal pain, no rashes.     Prior to Admission medications    Medication Sig Start Date End Date Taking? Authorizing Provider   propranolol (INDERAL) 20 MG tablet Take 20 mg by mouth 2 times daily 12/22/16 12/22/17 Yes Reported, Patient   clonazePAM (KLONOPIN) 0.5 MG tablet 0.5mg AM and 1 mg PM 1/12/17  Yes Yarelis Cain MD   topiramate (TOPAMAX) 50 MG tablet 150 mg twice a day 7/20/16  Yes Yarelis Cain MD   lamoTRIgine (LAMICTAL) 100 MG tablet Generic.  Take 1 tablet at morning (along with 150 mg) and 2 tablets at 10 pm. 7/20/16  Yes Yarelis Cain MD   lamoTRIgine (LAMICTAL) 150 MG tablet Take 1 tablet (along with 100 mg tablet) by mouth every  morning 7/20/16  Yes Yarelis Cain MD   aspirin 81 MG chewable tablet Take 81 mg by mouth daily.   Yes Reported, Patient   flecainide (TAMBOCOR) 50 MG tablet Take 50 mg by mouth daily.   Yes Reported, Patient   Multiple Vitamin (MULTI-VITAMIN PO) Take by mouth daily    Yes Reported, Patient   simvastatin (ZOCOR) 40 MG tablet Take  by mouth At Bedtime.   Yes Reported, Patient   Cholecalciferol (VITAMIN D PO) Take by mouth daily    Yes Reported, Patient       AED:      Tablet Size Number of Tablets/Capsules         AM  (morning)   10 PM (Night)               Lamotrigine  100 mg and 150 mg tablet  250  mg   200 mg            Topiramate   50 mg tablet  150 mg     150 mg            Klonopin 0.5 mg 0.5 mg   1 mg                            Medication Notes:  Decrease in Klonopin 9/2005 from 1.5 mg to 1mg resulted in seizure in 11/2005. He was increased back to Klonopin 1.5 mg and has remained seizure free. AED Medication Compliance:  compliant most of the time. Using a pill box:  Yes    Review of Systems:  Lethargy / Tiredness:  Yes  Nausea / Vomiting:  No  Double Vision:  No  Sleepiness:  Sleep is fine, but usually wakes up at 4am  Depression:  No  Slowed Cognitive Function:  No  Memory Problems:  No  Poor Balance:  He has noticed bad balance, several falls  Dizziness:  No  Sleep Changes:  No  Have you experienced a traumatic fall since your last visit: Yes  Are these falls related to your seizures: NO  Motor: Tremor in right hand    Other Issues:  Lives with wife. He is driving. Is patient safe to drive:  Yes. 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     Exam:    BP 92/64 (BP Location: Right arm, Patient Position: Chair, Cuff Size: Adult Regular)  Pulse 87  Temp 98.4  F (36.9  C)  Resp 16  Wt 83 kg (183 lb)  BMI 25.52 kg/m2     Wt Readings from Last 5 Encounters:   07/31/18 83 kg (183 lb)   08/09/17 80.7 kg (178 lb)   07/20/16 79.5 kg (175 lb 3.2 oz)   07/02/15 82.3 kg (181 lb 6.4 oz)   06/13/14 80.3 kg (177 lb)       Assessment and Plan:   1. Localization-related epilepsy, controlled, secondary to history of left frontal lobe abscess. The patient has been on polytherapy with Topamax, lamotrigine and Klonopin. Seizure free since 11/2005. We will continue same antiepileptic drug with no changes. He had a SDH in 7/2016 from a fall. We did discuss if his falls are related to incoordination or ataxia from lamotrigine.  Patient does not want to change his seizure medications at all because he is worried he may have breakthrough seizures.    2. Falls: I suspect his falls are secondary to mechanical falls and possibly his neuropathy.  On neurological exam today he was  able to tandem gait and his gait was stable.  He does have numbness in his feet.  He declined a metabolic workup to understand the etiology for his neuropathy.  I offered multiple strategies for fall prevention and patient essentially said no to the majority of these interventions.  He states that he will focus on holding the rails when he is walking up the stairs and carefully stepping around the rugs.  He typically trips over the rugs in his house.   3. Tremors. Stable. Most likely due to lamotrigine.   4. 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  Since placement of pacemaker.  He has not had syncope related to cardiac arrhythmias.  5. Bone Health: Ordered DEXA scan since he has a long history of antiepileptic drugs use.  Patient did not complete DEXA scan and declined scan.     PLAN:   1. Continue antiepileptic drug .     Tablet Size Number of Tablets/Capsules         AM  (morning)   10 PM (Night)               Lamotrigine  100 mg and 150 mg tablet  250  mg   200 mg            Topiramate   50 mg tablet  150 mg    150 mg            Klonopin 0.5 mg 0.5 mg   1 mg                            2. Follow up in 1 year.     As described above, I met with the patient for 40 minutes and during this time counseling was greater than 50% of the visit time.      Yarelis Cain MD

## 2018-08-02 ENCOUNTER — TRANSFERRED RECORDS (OUTPATIENT)
Dept: HEALTH INFORMATION MANAGEMENT | Facility: CLINIC | Age: 78
End: 2018-08-02

## 2018-08-03 DIAGNOSIS — G40.209 LOCALIZATION-RELATED PARTIAL EPILEPSY WITH COMPLEX PARTIAL SEIZURES (H): ICD-10-CM

## 2018-08-03 DIAGNOSIS — G40.209 PARTIAL EPILEPSY WITH IMPAIRMENT OF CONSCIOUSNESS (H): ICD-10-CM

## 2018-08-03 DIAGNOSIS — R20.9 DISTURBANCE OF SKIN SENSATION: ICD-10-CM

## 2018-08-03 LAB
ALBUMIN SERPL-MCNC: 4.4 G/DL (ref 3.4–5)
ALP SERPL-CCNC: 53 U/L (ref 40–150)
ALT SERPL-CCNC: 16 U/L (ref 9–55)
ANION GAP SERPL CALCULATED.3IONS-SCNC: ABNORMAL MMOL/L
AST SERPL-CCNC: 17 U/L (ref 10–40)
BILIRUB SERPL-MCNC: 0.7 MG/DL (ref 0.2–1.2)
BUN SERPL-MCNC: 20 MG/DL (ref 9–26)
CALCIUM SERPL-MCNC: 8.9 MG/DL (ref 8.4–10.4)
CHLORIDE SERPLBLD-SCNC: 112 MMOL/L (ref 98–109)
CO2 SERPL-SCNC: 22 MMOL/L (ref 22–31)
CREAT SERPL-MCNC: 1.1 MG/DL (ref 0.73–1.18)
GFR SERPL CREATININE-BSD FRML MDRD: >60 ML/MIN/1.73M2
GLUCOSE SERPL-MCNC: 94 MG/DL (ref 70–100)
LAMOTRIGINE SERPL-MCNC: 12.8 UG/ML (ref 2.5–15)
POTASSIUM SERPL-SCNC: 5 MMOL/L (ref 3.5–5.2)
PROT SERPL-MCNC: 6.9 G/DL (ref 6.4–8.3)
SODIUM SERPL-SCNC: 141 MMOL/L (ref 136–145)
TOPIRAMATE LEVEL: 11.4 UG/ML (ref 5–20)

## 2019-04-08 ENCOUNTER — TELEPHONE (OUTPATIENT)
Dept: NEUROLOGY | Facility: CLINIC | Age: 79
End: 2019-04-08

## 2019-04-08 NOTE — TELEPHONE ENCOUNTER
Form received via fax on 4/8/19. Form placed in nurse folder for completion.     DMV form completed. Forwarded to Dr. Cain for approval and signature

## 2019-04-10 NOTE — TELEPHONE ENCOUNTER
Completed form faxed and mailed to the DMV. Copy sent to pt and to scanning.     Pt requested that we call him so he can come pick it up. Copy in the pick-up folder at the .

## 2019-05-23 ENCOUNTER — TELEPHONE (OUTPATIENT)
Dept: NEUROLOGY | Facility: CLINIC | Age: 79
End: 2019-05-23

## 2019-05-23 ENCOUNTER — OFFICE VISIT (OUTPATIENT)
Dept: NEUROLOGY | Facility: CLINIC | Age: 79
End: 2019-05-23
Payer: COMMERCIAL

## 2019-05-23 VITALS
BODY MASS INDEX: 25.27 KG/M2 | DIASTOLIC BLOOD PRESSURE: 75 MMHG | HEART RATE: 80 BPM | SYSTOLIC BLOOD PRESSURE: 109 MMHG | WEIGHT: 181.2 LBS

## 2019-05-23 DIAGNOSIS — G40.209 PARTIAL EPILEPSY WITH IMPAIRMENT OF CONSCIOUSNESS (H): ICD-10-CM

## 2019-05-23 DIAGNOSIS — R20.9 DISTURBANCE OF SKIN SENSATION: ICD-10-CM

## 2019-05-23 DIAGNOSIS — G40.209 LOCALIZATION-RELATED PARTIAL EPILEPSY WITH COMPLEX PARTIAL SEIZURES (H): ICD-10-CM

## 2019-05-23 RX ORDER — CLONAZEPAM 0.5 MG/1
TABLET ORAL
Qty: 270 TABLET | Refills: 1 | Status: SHIPPED | OUTPATIENT
Start: 2019-05-23 | End: 2019-10-17

## 2019-05-23 RX ORDER — TOPIRAMATE 50 MG/1
TABLET, FILM COATED ORAL
Qty: 540 TABLET | Refills: 3 | Status: SHIPPED | OUTPATIENT
Start: 2019-05-23 | End: 2020-04-22

## 2019-05-23 RX ORDER — LAMOTRIGINE 150 MG/1
TABLET ORAL
Qty: 90 TABLET | Refills: 3 | Status: SHIPPED | OUTPATIENT
Start: 2019-05-23 | End: 2020-04-22

## 2019-05-23 RX ORDER — LAMOTRIGINE 100 MG/1
TABLET ORAL
Qty: 270 TABLET | Refills: 3 | Status: SHIPPED | OUTPATIENT
Start: 2019-05-23 | End: 2020-04-22

## 2019-05-23 NOTE — PROGRESS NOTES
[facility] Epilepsy Care Progress Note    Patient:  Ronny Rinaldi  :  1940   Age:  79 year old   Today's Office Visit:  2019    Epilepsy Data:  Patient History  Primary Epileptologist/Provider: Yarelis Cain M.D.  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   Tests/Surgery History  Last EE01  Last MRI: 97  Last Neuropsych Testin97  Seizure Record  Current Visit Date: 19  Previous Visit Date: 18  Months since last visit: 9.72  Seizure Type 1: Simple partial seizures unspecified  Description of Sz Type 1: fear and anxiety with no loss of awareness  # of Type 1 Seizure since last visit: 0  Freq. Type 1 / Month: 0  Seizure Type 2: Complex partial seizures unspecified  Description of Sz Type 2: anxiety, fear, psychic aura, followed by right arm clonic and face clonic, progresses to loss of awareness and whole body convulsion   # of Type 2 Seizure since last visit: 0  Freq. Type 2 / Month: 0             Epilepsy Data:  Copied forward: Right-handed gentleman with a history of seizures secondary to left frontal abscess in . Onset of seizures in . 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 He underwent a hue hole with drainage of the abscess followed by antibiotic therapy. Six months after the abscess was diagnosed he began having seizures. He had simple partial progressing to complex partial seizures that frequently secondarily generalized. He was started on medications and had good seizure control up until . Subsequently he had been doing well on polytherapy with lamotrigine, Topamax and Klonopin. The Klonopin was mostly added for the management of his anxiety.  In  klonopin was decreased from 1.5  mg to 1mg and he had a breakthrough seizure. He has been seizure free since 11/2005 on his current combination of Topamax, Lamictal and Klonopin.   His last EEG was in 2001 and was notable for sharp waves in the left frontal and right temporal regions. EEGs prior to that back in 1997 showed some seizures arising from the left hemisphere and associated with clinical events of being unresponsive. His last MRI of the brain was in 1997 and shows left frontal areas of encephalomalacia, focal atrophic changes in the left frontal region, and small area of the right lateral frontal signal changes consistent with encephalomalacia.     History of Present Illness:   The patient returns today for 1 year followup. He came without his wife. He has remained seizure free since 2005.  Patient expressed he was very upset about incorrectly formed driving form.  Instead of checking 1 year review review or for your review we had accidentally checked 6 months.  He was extremely upset about this.  He has not had a seizure since his last visit.  Overall he is doing well.  He continues to have unstable gait.  I did review with him that his lamotrigine level is 12.8 and it is reasonable to lower lamotrigine by 50 mg.  He refused to do this.  He is worried that he will have a seizure.  Since his last visit he has been working with physical therapy and working out more.  I reviewed with him that certainly lamotrigine at times may cause incoordination and ataxia and increased risk for falls in elderly patients.  I recommended that we may lower lamotrigine to 200 mg twice a day and he declined.  He is worried that he will have breakthrough seizures.  At this time we will continue same seizure medications with no changes. Currently, on antiepileptic drug there is no double vision, no mood changes, no nausea, no vomiting, no abdominal pain, no rashes.   Prior to Admission medications    Medication Sig Start Date End Date Taking? Authorizing  Provider   aspirin 81 MG chewable tablet Take 81 mg by mouth daily.   Yes Reported, Patient   Cholecalciferol (VITAMIN D PO) Take by mouth daily    Yes Reported, Patient   clonazePAM (KLONOPIN) 0.5 MG tablet 0.5mg AM and 1 mg PM 5/23/19  Yes Yarelis Cain MD   diazepam (DIAZEPAM INTENSOL) 5 MG/ML (HIGH CONC) solution Take 1ML (5mg) for generalized seizures greater than 3 minutes or cluster of three seizures within 8 hours. May repeat once in 10 minutes of seizures continue. Do not exceed 10 mg in 24 hours. 8/9/17  Yes Yarelis Cain MD   flecainide (TAMBOCOR) 50 MG tablet Take 50 mg by mouth daily.   Yes Reported, Patient   lamoTRIgine (LAMICTAL) 100 MG tablet Generic.  Take 1 tablet at morning (along with 150 mg) and 2 tablets at 10 pm. 5/23/19  Yes Yarelis Cain MD   lamoTRIgine (LAMICTAL) 150 MG tablet Take 1 tablet (along with 100 mg tablet) by mouth every  morning 5/23/19  Yes Yarelis Cain MD   Multiple Vitamin (MULTI-VITAMIN PO) Take by mouth daily    Yes Reported, Patient   simvastatin (ZOCOR) 40 MG tablet Take  by mouth At Bedtime.   Yes Reported, Patient   topiramate (TOPAMAX) 50 MG tablet 150 mg twice a day 5/23/19  Yes Yarelis Cain MD   propranolol (INDERAL) 20 MG tablet Take 20 mg by mouth 2 times daily 12/22/16 12/22/17  Reported, Patient       AED:      Tablet Size Number of Tablets/Capsules         AM  (morning)   10 PM (Night)               Lamotrigine  100 mg and 150 mg tablet  250  mg   200 mg            Topiramate   50 mg tablet  150 mg    150 mg            Klonopin 0.5 mg 0.5 mg   1 mg                            Medication Notes:  Decrease in Klonopin 9/2005 from 1.5 mg to 1mg resulted in seizure in 11/2005. He was increased back to Klonopin 1.5 mg and has remained seizure free. AED Medication Compliance:  compliant most of the time. Using a pill box:  Yes    Review of Systems:  Lethargy / Tiredness:  Yes  Nausea / Vomiting:  No  Double Vision:  No  Sleepiness:  Sleep is fine, but usually  wakes up at 4am  Depression:  No  Slowed Cognitive Function:  No  Memory Problems:  No  Poor Balance:  He has noticed bad balance, several falls  Dizziness:  No  Sleep Changes:  No  Have you experienced a traumatic fall since your last visit: Yes  Are these falls related to your seizures: NO  Motor: Tremor in right hand    Other Issues:  Lives with wife. He is driving. Is patient safe to drive:  Yes. 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     Exam:    /75 (BP Location: Left arm, Patient Position: Chair, Cuff Size: Adult Regular)   Pulse 80   Wt 181 lb 3.2 oz (82.2 kg)   BMI 25.27 kg/m       Wt Readings from Last 5 Encounters:   05/23/19 181 lb 3.2 oz (82.2 kg)   07/31/18 183 lb (83 kg)   08/09/17 178 lb (80.7 kg)   07/20/16 175 lb 3.2 oz (79.5 kg)   07/02/15 181 lb 6.4 oz (82.3 kg)       Assessment and Plan:   1.  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 The patient has been on polytherapy with antiepileptic drug. Seizure free since 11/2005. We will continue same antiepileptic drug with no changes. He had a SDH in 7/2016 from a fall. We did discuss if his falls are related to incoordination or ataxia from lamotrigine.  Patient does not want to change his seizure medications at all because he is worried he may have breakthrough seizures.    2. Falls: I suspect his falls are secondary to mechanical falls and possibly his neuropathy.  He is focusing on therapy and working out.  Again I did recommend lowering lamotrigine but he refused.  Patient is educated on side effects of higher dose of lamotrigine.  3. Tremors. Stable. Most likely due to lamotrigine.   4. 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  Since placement of pacemaker.  He has not had syncope related to cardiac arrhythmias.  5. Bone Health: Ordered DEXA scan since he has a long history of antiepileptic drugs use.  Patient did not  complete DEXA scan and declined scan.     PLAN:   1. Continue antiepileptic drug .     Tablet Size Number of Tablets/Capsules         AM  (morning)   10 PM (Night)               Lamotrigine  100 mg and 150 mg tablet  250  mg   200 mg            Topiramate   50 mg tablet  150 mg    150 mg            Klonopin 0.5 mg 0.5 mg   1 mg                            2. Follow up in 1 year.     As described above, I met with the patient for 10 minutes and during this time counseling was greater than 50% of the visit time.  Patient was seen by Emilie Parikh for 30 minutes.  Much of the history and forms were completed with Nurse Parikh.  Total time spent in patient care was 40 minutes.      Yarelis Cain MD

## 2019-05-23 NOTE — TELEPHONE ENCOUNTER
DMV form was completed while pt was in clinic. Completed form faxed and mailed to the DMV. Copy sent home with pt and to scanning.

## 2019-05-23 NOTE — LETTER
2019     RE: Ronny Rinaldi  : 1940   MRN: [id number]      Dear Colleague,    Thank you for referring your patient, Ronny Rinaldi, to the [facility] EPILEPSY CARE at [facility]. Please see a copy of my visit note below.    [facility]    Patient:  Ronny Rinaldi  :  1940   Age:  79 year old   Today's Office Visit:  2019    Epilepsy Data:  Patient History  Primary Epileptologist/Provider: Yarelis Cain M.D.  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   Tests/Surgery History  Last EE01  Last MRI: 97  Last Neuropsych Testin97  Seizure Record  Current Visit Date: 19  Previous Visit Date: 18  Months since last visit: 9.72  Seizure Type 1: Simple partial seizures unspecified  Description of Sz Type 1: fear and anxiety with no loss of awareness  # of Type 1 Seizure since last visit: 0  Freq. Type 1 / Month: 0  Seizure Type 2: Complex partial seizures unspecified  Description of Sz Type 2: anxiety, fear, psychic aura, followed by right arm clonic and face clonic, progresses to loss of awareness and whole body convulsion   # of Type 2 Seizure since last visit: 0  Freq. Type 2 / Month: 0             Epilepsy Data:  Copied forward: Right-handed gentleman with a history of seizures secondary to left frontal abscess in . Onset of seizures in . 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 He underwent a hue hole with drainage of the abscess followed by antibiotic therapy. Six months after the abscess was diagnosed he began having seizures. He had simple partial progressing to complex partial seizures that frequently secondarily generalized.  He was started on medications and had good seizure control up until 1999. Subsequently he had been doing well on polytherapy with lamotrigine, Topamax and Klonopin. The Klonopin was mostly added for the management of his anxiety.  In 2005 klonopin was decreased from 1.5 mg to 1mg and he had a breakthrough seizure. He has been seizure free since 11/2005 on his current combination of Topamax, Lamictal and Klonopin.   His last EEG was in 2001 and was notable for sharp waves in the left frontal and right temporal regions. EEGs prior to that back in 1997 showed some seizures arising from the left hemisphere and associated with clinical events of being unresponsive. His last MRI of the brain was in 1997 and shows left frontal areas of encephalomalacia, focal atrophic changes in the left frontal region, and small area of the right lateral frontal signal changes consistent with encephalomalacia.     History of Present Illness:   The patient returns today for 1 year followup. He came without his wife. He has remained seizure free since 2005.  Patient expressed he was very upset about incorrectly formed driving form.  Instead of checking 1 year review review or for your review we had accidentally checked 6 months.  He was extremely upset about this.  He has not had a seizure since his last visit.  Overall he is doing well.  He continues to have unstable gait.  I did review with him that his lamotrigine level is 12.8 and it is reasonable to lower lamotrigine by 50 mg.  He refused to do this.  He is worried that he will have a seizure.  Since his last visit he has been working with physical therapy and working out more.  I reviewed with him that certainly lamotrigine at times may cause incoordination and ataxia and increased risk for falls in elderly patients.  I recommended that we may lower lamotrigine to 200 mg twice a day and he declined.  He is worried that he will have breakthrough seizures.  At this time we will continue  same seizure medications with no changes. Currently, on antiepileptic drug there is no double vision, no mood changes, no nausea, no vomiting, no abdominal pain, no rashes.   Prior to Admission medications    Medication Sig Start Date End Date Taking? Authorizing Provider   aspirin 81 MG chewable tablet Take 81 mg by mouth daily.   Yes Reported, Patient   Cholecalciferol (VITAMIN D PO) Take by mouth daily    Yes Reported, Patient   clonazePAM (KLONOPIN) 0.5 MG tablet 0.5mg AM and 1 mg PM 5/23/19  Yes Yarelis Cain MD   diazepam (DIAZEPAM INTENSOL) 5 MG/ML (HIGH CONC) solution Take 1ML (5mg) for generalized seizures greater than 3 minutes or cluster of three seizures within 8 hours. May repeat once in 10 minutes of seizures continue. Do not exceed 10 mg in 24 hours. 8/9/17  Yes Yarelis Cain MD   flecainide (TAMBOCOR) 50 MG tablet Take 50 mg by mouth daily.   Yes Reported, Patient   lamoTRIgine (LAMICTAL) 100 MG tablet Generic.  Take 1 tablet at morning (along with 150 mg) and 2 tablets at 10 pm. 5/23/19  Yes Yarelis Cain MD   lamoTRIgine (LAMICTAL) 150 MG tablet Take 1 tablet (along with 100 mg tablet) by mouth every  morning 5/23/19  Yes Yarelis Cain MD   Multiple Vitamin (MULTI-VITAMIN PO) Take by mouth daily    Yes Reported, Patient   simvastatin (ZOCOR) 40 MG tablet Take  by mouth At Bedtime.   Yes Reported, Patient   topiramate (TOPAMAX) 50 MG tablet 150 mg twice a day 5/23/19  Yes Yarelis Cain MD   propranolol (INDERAL) 20 MG tablet Take 20 mg by mouth 2 times daily 12/22/16 12/22/17  Reported, Patient       AED:      Tablet Size Number of Tablets/Capsules         AM  (morning)   10 PM (Night)               Lamotrigine  100 mg and 150 mg tablet  250  mg   200 mg            Topiramate   50 mg tablet  150 mg    150 mg            Klonopin 0.5 mg 0.5 mg   1 mg                            Medication Notes:  Decrease in Klonopin 9/2005 from 1.5 mg to 1mg resulted in seizure in 11/2005. He was increased back  to Klonopin 1.5 mg and has remained seizure free. AED Medication Compliance:  compliant most of the time. Using a pill box:  Yes    Review of Systems:  Lethargy / Tiredness:  Yes  Nausea / Vomiting:  No  Double Vision:  No  Sleepiness:  Sleep is fine, but usually wakes up at 4am  Depression:  No  Slowed Cognitive Function:  No  Memory Problems:  No  Poor Balance:  He has noticed bad balance, several falls  Dizziness:  No  Sleep Changes:  No  Have you experienced a traumatic fall since your last visit: Yes  Are these falls related to your seizures: NO  Motor: Tremor in right hand    Other Issues:  Lives with wife. He is driving. Is patient safe to drive:  Yes. 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     Exam:    /75 (BP Location: Left arm, Patient Position: Chair, Cuff Size: Adult Regular)   Pulse 80   Wt 181 lb 3.2 oz (82.2 kg)   BMI 25.27 kg/m        Wt Readings from Last 5 Encounters:   05/23/19 181 lb 3.2 oz (82.2 kg)   07/31/18 183 lb (83 kg)   08/09/17 178 lb (80.7 kg)   07/20/16 175 lb 3.2 oz (79.5 kg)   07/02/15 181 lb 6.4 oz (82.3 kg)       Assessment and Plan:   1.  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 The patient has been on polytherapy with antiepileptic drug. Seizure free since 11/2005. We will continue same antiepileptic drug with no changes. He had a SDH in 7/2016 from a fall. We did discuss if his falls are related to incoordination or ataxia from lamotrigine.  Patient does not want to change his seizure medications at all because he is worried he may have breakthrough seizures.    2. Falls: I suspect his falls are secondary to mechanical falls and possibly his neuropathy.  He is focusing on therapy and working out.  Again I did recommend lowering lamotrigine but he refused.  Patient is educated on side effects of higher dose of lamotrigine.  3. Tremors. Stable. Most likely due to lamotrigine.   4. History of  paroxsymal atrial fibrillation and bradyarrthyma. 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  Since placement of pacemaker.  He has not had syncope related to cardiac arrhythmias.  5. Bone Health: Ordered DEXA scan since he has a long history of antiepileptic drugs use.  Patient did not complete DEXA scan and declined scan.     PLAN:   1. Continue antiepileptic drug .     Tablet Size Number of Tablets/Capsules         AM  (morning)   10 PM (Night)               Lamotrigine  100 mg and 150 mg tablet  250  mg   200 mg            Topiramate   50 mg tablet  150 mg    150 mg            Klonopin 0.5 mg 0.5 mg   1 mg                            2. Follow up in 1 year.     As described above, I met with the patient for 10 minutes and during this time counseling was greater than 50% of the visit time.  Patient was seen by Emilie Parikh for 30 minutes.  Much of the history and forms were completed with Nurse Parikh.  Total time spent in patient care was 40 minutes.    Yarelis Cain MD

## 2019-05-24 ENCOUNTER — TRANSFERRED RECORDS (OUTPATIENT)
Dept: HEALTH INFORMATION MANAGEMENT | Facility: CLINIC | Age: 79
End: 2019-05-24

## 2019-05-24 NOTE — PROGRESS NOTES
Nurse Progress Note:    Ronny returns to the clinic today for his yearly appointment. He requests medication refills and his DMV form to be updated. He reports no seizures since 2005. At the last office visit, Dr. Cain spoke to him about falls, bone health, home OT. We reviewed this today, he has had 2-3 falls without injury, 0 with injury since his last visit. 2 of these 3 were in the setting of tripping on something. He denies falling due to dizziness, collapse, loss of balance. He has made enviornmental changes in his home to reduce the risk for falls such as installing night lights so that he can see better on his way to the bathroom at night. In addition, he reports going to [employer] 3-4 times per week and doing the treadmill or rowing machine for 45 minutes. He again declines home OT and DEXA scan. Previously, he reported tremors. This has improved and his hardly noticeable with arm extension. The rest of his exam is unremarkable. He rarely misses his AED doses and is using a pillbox. Current AEDs are -250, -150 and klonopin 0.5-1. His main concern today it to get his medications refilled without any changes and to get his DMV form signed. There was a situation where he faxed his form to us this January and it was not received by out office. He subsequently had a suspended 's license for two months. He was able to repeal this and regain licensing but the form was prepared for 6 month MD approval. He has not had a seizure in more than 10 years so he would like this extended so he doesn't lose his license again.     Office Visit was staffed with Dr. Cain. His DMV form was signed for a 4 year approval interval. Refills provided by MD.

## 2019-10-17 DIAGNOSIS — G40.209 LOCALIZATION-RELATED PARTIAL EPILEPSY WITH COMPLEX PARTIAL SEIZURES (H): ICD-10-CM

## 2019-10-17 DIAGNOSIS — G40.209 PARTIAL EPILEPSY WITH IMPAIRMENT OF CONSCIOUSNESS (H): ICD-10-CM

## 2019-10-17 DIAGNOSIS — R20.9 DISTURBANCE OF SKIN SENSATION: ICD-10-CM

## 2019-10-17 RX ORDER — CLONAZEPAM 0.5 MG/1
TABLET ORAL
Qty: 270 TABLET | Refills: 1 | Status: SHIPPED | OUTPATIENT
Start: 2019-10-17 | End: 2020-04-22

## 2020-01-27 ENCOUNTER — TELEPHONE (OUTPATIENT)
Dept: NEUROLOGY | Facility: CLINIC | Age: 80
End: 2020-01-27

## 2020-02-04 ENCOUNTER — TELEPHONE (OUTPATIENT)
Dept: NEUROLOGY | Facility: CLINIC | Age: 80
End: 2020-02-04

## 2020-02-07 NOTE — TELEPHONE ENCOUNTER
DMV form fax and mailed to DMV.Form fax to [facility] and placed on form folder.Copy mailed to patient.

## 2020-04-22 ENCOUNTER — VIRTUAL VISIT (OUTPATIENT)
Dept: NEUROLOGY | Facility: CLINIC | Age: 80
End: 2020-04-22

## 2020-04-22 DIAGNOSIS — R20.9 DISTURBANCE OF SKIN SENSATION: ICD-10-CM

## 2020-04-22 DIAGNOSIS — G40.209 PARTIAL EPILEPSY WITH IMPAIRMENT OF CONSCIOUSNESS (H): ICD-10-CM

## 2020-04-22 DIAGNOSIS — G40.209 LOCALIZATION-RELATED PARTIAL EPILEPSY WITH COMPLEX PARTIAL SEIZURES (H): ICD-10-CM

## 2020-04-22 RX ORDER — CLONAZEPAM 0.5 MG/1
TABLET ORAL
Qty: 270 TABLET | Refills: 1 | Status: SHIPPED | OUTPATIENT
Start: 2020-04-22 | End: 2020-10-15

## 2020-04-22 RX ORDER — LAMOTRIGINE 100 MG/1
TABLET ORAL
Qty: 270 TABLET | Refills: 3 | Status: SHIPPED | OUTPATIENT
Start: 2020-04-22 | End: 2021-04-28

## 2020-04-22 RX ORDER — LAMOTRIGINE 150 MG/1
TABLET ORAL
Qty: 90 TABLET | Refills: 3 | Status: SHIPPED | OUTPATIENT
Start: 2020-04-22 | End: 2021-04-28

## 2020-04-22 RX ORDER — TOPIRAMATE 50 MG/1
TABLET, FILM COATED ORAL
Qty: 540 TABLET | Refills: 3 | Status: SHIPPED | OUTPATIENT
Start: 2020-04-22 | End: 2021-04-28

## 2020-04-22 NOTE — PROGRESS NOTES
"Ronny Rinaldi is a 80 year old male who is being evaluated via a billable telephone visit.      The patient has been notified of following:     \"This telephone visit will be conducted via a call between you and your physician/provider. We have found that certain health care needs can be provided without the need for a physical exam.  This service lets us provide the care you need with a short phone conversation.  If a prescription is necessary we can send it directly to your pharmacy.  If lab work is needed we can place an order for that and you can then stop by our lab to have the test done at a later time.    Telephone visits are billed at different rates depending on your insurance coverage. During this emergency period, for some insurers they may be billed the same as an in-person visit.  Please reach out to your insurance provider with any questions.    If during the course of the call the physician/provider feels a telephone visit is not appropriate, you will not be charged for this service.\"    Patient has given verbal consent for Telephone visit?  Yes    How would you like to obtain your AVS? Mail a copy    Phone call duration: 21 minutes      Thank you  Fermín Figueroa LPN      HPI: Patient/caregiver has consented to this visit. Since our last visit patient had 0 seizures. Patient is compliant with anti seizure medications  or went to the emergency room for 2/2020 for syncope due to valium given for prostate biopsy. He has prostate cancer, he will have radiation and he will start Lupron Depot. I reviewed there are no drug interactions with topiramate or lamotrigine.  He has remained seizure free since 2005. Currently, on antiepileptic drugs there is no fatigue, no upset stomach , no dizziness, no double vision  or no mood changes (feelings of depression, irritablity, more argumentative). On this visit we spoke about patient's seizures, antiepileptic drug, and plan of epilepsy are. Patient/caregiver was " agreeable with plan of care.     Current Antiepileptic drug:         Tablet Size Number of Tablets/Capsules          AM  (morning)    10 PM (Night)                 Lamotrigine  100 mg and 150 mg tablet  250  mg    200 mg             Topiramate   50 mg tablet  150 mg    150 mg             Klonopin 0.5 mg 0.5 mg    1 mg                                       Review of System: No nausea, no vomiting, no diarrhea, no fevers, no cough.     Exam: Speech was fluent, patient comprehension was in tact, and he was able to recall pertinent medical history in regard to his care.     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 The patient has been on polytherapy with antiepileptic drug. Seizure free since 11/2005. We will continue same antiepileptic drug with no changes. He had a SDH in 7/2016 from a fall.  Continue antiepileptic drug with no changes.         Plan:   Follow-up virtual face to face visit in 6 months     Renewed seizure medications    Continue antiepileptic drug:                 Tablet Size Number of Tablets/Capsules          AM  (morning)    10 PM (Night)                 Lamotrigine  100 mg and 150 mg tablet  250  mg    200 mg             Topiramate   50 mg tablet  150 mg    150 mg             Klonopin 0.5 mg 0.5 mg    1 mg                                     Encouraged self care activity of stress management, mental health care, sleep, and healthy diet.       Time on telephone visit:   Start Time: 3:10 pm  End Time: 3:30 pm   I spent total of 21 minutes with the patient for our visit. I addressed all questions the patient/caregiver raised in regards to the  patient's medical care. Good rohit communication efforts were made with patient over the phone to be HIPPA compliant.     Yarelis Cain MD   Epilepsy Staff

## 2020-04-23 ENCOUNTER — TELEPHONE (OUTPATIENT)
Dept: NEUROLOGY | Facility: CLINIC | Age: 80
End: 2020-04-23

## 2020-10-14 DIAGNOSIS — G40.209 LOCALIZATION-RELATED PARTIAL EPILEPSY WITH COMPLEX PARTIAL SEIZURES (H): ICD-10-CM

## 2020-10-14 DIAGNOSIS — R20.9 DISTURBANCE OF SKIN SENSATION: ICD-10-CM

## 2020-10-14 DIAGNOSIS — G40.209 PARTIAL EPILEPSY WITH IMPAIRMENT OF CONSCIOUSNESS (H): ICD-10-CM

## 2020-10-15 RX ORDER — CLONAZEPAM 0.5 MG/1
TABLET ORAL
Qty: 270 TABLET | Refills: 1 | Status: SHIPPED | OUTPATIENT
Start: 2020-10-15 | End: 2021-04-28

## 2020-10-15 NOTE — TELEPHONE ENCOUNTER
clonazePAM (KLONOPIN) 0.5 MG tablet    Last Written Prescription Date:  4/22/2020  Last Fill Quantity: 270,   # refills: 1  Last Office Visit : 4/22/2020  Future Office visit:  4/7/2021    Routing refill request to provider for review/approval because:  Drug not on the [facility], P or [facility] refill protocol or controlled substance      Neeru Dunn RN  Central Triage Red Flags/Med Refills

## 2021-04-28 ENCOUNTER — OFFICE VISIT (OUTPATIENT)
Dept: NEUROLOGY | Facility: CLINIC | Age: 81
End: 2021-04-28
Payer: COMMERCIAL

## 2021-04-28 VITALS
SYSTOLIC BLOOD PRESSURE: 129 MMHG | TEMPERATURE: 97.2 F | WEIGHT: 187 LBS | DIASTOLIC BLOOD PRESSURE: 84 MMHG | HEART RATE: 78 BPM | BODY MASS INDEX: 26.08 KG/M2

## 2021-04-28 DIAGNOSIS — R20.9 DISTURBANCE OF SKIN SENSATION: ICD-10-CM

## 2021-04-28 DIAGNOSIS — G40.209 LOCALIZATION-RELATED PARTIAL EPILEPSY WITH COMPLEX PARTIAL SEIZURES (H): Primary | ICD-10-CM

## 2021-04-28 DIAGNOSIS — G40.209 PARTIAL EPILEPSY WITH IMPAIRMENT OF CONSCIOUSNESS (H): ICD-10-CM

## 2021-04-28 RX ORDER — LAMOTRIGINE 150 MG/1
TABLET ORAL
Qty: 90 TABLET | Refills: 3 | Status: SHIPPED | OUTPATIENT
Start: 2021-04-28 | End: 2022-03-18

## 2021-04-28 RX ORDER — CLONAZEPAM 0.5 MG/1
TABLET ORAL
Qty: 270 TABLET | Refills: 1 | Status: SHIPPED | OUTPATIENT
Start: 2021-04-28 | End: 2021-11-12

## 2021-04-28 RX ORDER — TOPIRAMATE 50 MG/1
TABLET, FILM COATED ORAL
Qty: 540 TABLET | Refills: 3 | Status: SHIPPED | OUTPATIENT
Start: 2021-04-28 | End: 2022-03-18

## 2021-04-28 RX ORDER — LAMOTRIGINE 100 MG/1
TABLET ORAL
Qty: 270 TABLET | Refills: 3 | Status: SHIPPED | OUTPATIENT
Start: 2021-04-28 | End: 2022-03-18

## 2021-04-28 ASSESSMENT — PAIN SCALES - GENERAL: PAINLEVEL: NO PAIN (0)

## 2021-04-28 NOTE — LETTER
2021       RE: Ronny Rinaldi  : 1940   MRN: [id number]      Dear Colleague,    Thank you for referring your patient, Ronny Rinaldi, to the [facility] EPILEPSY CARE at [facility]. Please see a copy of my visit note below.      [facility] Epilepsy Care Progress Note    Patient:  Ronny Rinaldi  :  1940   Age:  81 year old   Today's Office Visit:  2021    HPI: Since our last visit patient had no seizures. Patient is compliant with anti seizure medications, he had ER visit 2020 for syncope (he thinks this was secondary to a new medication) and 2020 he had  syncope due to valium (4 tab) given for prostate biopsy. Prostate cancer is in remission.     He has remained seizure free since . Currently, on antiepileptic drugs there is no fatigue, no upset stomach , no dizziness, no double vision  or no mood changes (feelings of depression, irritablity, more argumentative). On this visit we spoke about patient's seizures, antiepileptic drug, and plan of epilepsy are. Patient/caregiver was agreeable with plan of care.     Current Antiepileptic drug:                     Tablet Size Number of Tablets/Capsules          AM  (morning)    10 PM (Night)                 Lamotrigine  100 mg and 150 mg tablet  250  mg    200 mg             Topiramate   50 mg tablet  150 mg    150 mg             Klonopin 0.5 mg 0.5 mg    1 mg                                 Exam: Alert, orientated, speech is fluent, face symmetric, tongue midline, extra ocular movements in tact, no focal weakness, stable gait, not able to tandem.      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   History of SDH from fall 2016    Discussion: Focal epilepsy with impairment in  awareness, controlled, secondary to history of left frontal lobe abscess. The patient has been on polytherapy with antiepileptic drug. His LTG level is high at 12, but, he does not want to lower LTG due to seizures risk.  He had a SDH in 7/2016 from a fall.He has been seizure free since 11/2005. We will continue same antiepileptic drug with no changes. Klonopin was reduced in the past and he had breakthrough seizure.  Continue antiepileptic drug with no changes.         Plan:   Check antiepileptic drug levels (LTG, TPM), AST, ALT for efficacy, toxicity, and side effects.   Patient has a complex epilepsy condition and requires lab monitoring to avoid side effects that may be harmful    Renewed seizure medications    Continue antiepileptic drug:                 Tablet Size Number of Tablets/Capsules          AM  (morning)    10 PM (Night)                 Lamotrigine  100 mg and 150 mg tablet  250  mg    200 mg             Topiramate   50 mg tablet  150 mg    150 mg             Klonopin 0.5 mg 0.5 mg    1 mg                               He has pacemaker, so we have not repeated MRI brain    Follow up 1 year           Yarelis Cain MD   Epilepsy Staff

## 2021-04-28 NOTE — PROGRESS NOTES
[facility] Epilepsy Care Progress Note    Patient:  Ronny Rinaldi  :  1940   Age:  81 year old   Today's Office Visit:  2021    HPI: Since our last visit patient had no seizures. Patient is compliant with anti seizure medications, he had ER visit 2020 for syncope (he thinks this was secondary to a new medication) and 2020 he had  syncope due to valium (4 tab) given for prostate biopsy. Prostate cancer is in remission.     He has remained seizure free since . Currently, on antiepileptic drugs there is no fatigue, no upset stomach , no dizziness, no double vision  or no mood changes (feelings of depression, irritablity, more argumentative). On this visit we spoke about patient's seizures, antiepileptic drug, and plan of epilepsy are. Patient/caregiver was agreeable with plan of care.     Current Antiepileptic drug:                     Tablet Size Number of Tablets/Capsules          AM  (morning)    10 PM (Night)                 Lamotrigine  100 mg and 150 mg tablet  250  mg    200 mg             Topiramate   50 mg tablet  150 mg    150 mg             Klonopin 0.5 mg 0.5 mg    1 mg                                 Exam: Alert, orientated, speech is fluent, face symmetric, tongue midline, extra ocular movements in tact, no focal weakness, stable gait, not able to tandem.      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 The patient has been on polytherapy with antiepileptic drug. His LTG level is high at 12, but, he does not want to lower LTG due to seizures risk.  He had a SDH in 2016 from a fall.He has been seizure free  since 11/2005. We will continue same antiepileptic drug with no changes. Klonopin was reduced in the past and he had breakthrough seizure.  Continue antiepileptic drug with no changes.         Plan:   Check antiepileptic drug levels (LTG, TPM), AST, ALT for efficacy, toxicity, and side effects.   Patient has a complex epilepsy condition and requires lab monitoring to avoid side effects that may be harmful    Renewed seizure medications    Continue antiepileptic drug:                 Tablet Size Number of Tablets/Capsules          AM  (morning)    10 PM (Night)                 Lamotrigine  100 mg and 150 mg tablet  250  mg    200 mg             Topiramate   50 mg tablet  150 mg    150 mg             Klonopin 0.5 mg 0.5 mg    1 mg                               He has pacemaker, so we have not repeated MRI brain    Follow up 1 year           Yarelis Cain MD   Epilepsy Staff

## 2021-05-12 ENCOUNTER — TRANSFERRED RECORDS (OUTPATIENT)
Dept: HEALTH INFORMATION MANAGEMENT | Facility: CLINIC | Age: 81
End: 2021-05-12

## 2021-05-13 LAB
ALT SERPL-CCNC: 13 U/L (ref 0–55)
AST SERPL-CCNC: 15 U/L (ref 10–40)
CREAT SERPL-MCNC: 1.2 MG/DL (ref 0.73–1.18)
GFR SERPL CREATININE-BSD FRML MDRD: 57 ML/MIN/1.73M2
LAMOTRIGINE SERPL-MCNC: 12.1 MCG/ML (ref 2–20)

## 2021-05-14 DIAGNOSIS — G40.209 LOCALIZATION-RELATED PARTIAL EPILEPSY WITH COMPLEX PARTIAL SEIZURES (H): ICD-10-CM

## 2021-05-14 DIAGNOSIS — G40.209 PARTIAL EPILEPSY WITH IMPAIRMENT OF CONSCIOUSNESS (H): ICD-10-CM

## 2021-05-14 LAB — TOPIRAMATE LEVEL: 10.3 UG/ML (ref 5–20)

## 2021-05-17 DIAGNOSIS — G40.209 LOCALIZATION-RELATED PARTIAL EPILEPSY WITH COMPLEX PARTIAL SEIZURES (H): ICD-10-CM

## 2021-05-17 DIAGNOSIS — G40.209 PARTIAL EPILEPSY WITH IMPAIRMENT OF CONSCIOUSNESS (H): ICD-10-CM

## 2021-11-10 DIAGNOSIS — R20.9 DISTURBANCE OF SKIN SENSATION: ICD-10-CM

## 2021-11-10 DIAGNOSIS — G40.209 PARTIAL EPILEPSY WITH IMPAIRMENT OF CONSCIOUSNESS (H): ICD-10-CM

## 2021-11-10 DIAGNOSIS — G40.209 LOCALIZATION-RELATED PARTIAL EPILEPSY WITH COMPLEX PARTIAL SEIZURES (H): ICD-10-CM

## 2021-11-12 RX ORDER — CLONAZEPAM 0.5 MG/1
TABLET ORAL
Qty: 270 TABLET | Refills: 1 | Status: SHIPPED | OUTPATIENT
Start: 2021-11-12 | End: 2022-04-29

## 2021-11-12 NOTE — TELEPHONE ENCOUNTER
Last visit with  4/28/2021  Recommended follow up 1 year  Plan show to continue clonazepam at 0.5mg QAM and 1mg at bedtime    Prescription queued and routed to  for review/signing

## 2022-03-14 DIAGNOSIS — G40.209 PARTIAL EPILEPSY WITH IMPAIRMENT OF CONSCIOUSNESS (H): ICD-10-CM

## 2022-03-14 DIAGNOSIS — G40.209 LOCALIZATION-RELATED PARTIAL EPILEPSY WITH COMPLEX PARTIAL SEIZURES (H): ICD-10-CM

## 2022-03-14 DIAGNOSIS — R20.9 DISTURBANCE OF SKIN SENSATION: ICD-10-CM

## 2022-03-18 RX ORDER — TOPIRAMATE 50 MG/1
150 TABLET, FILM COATED ORAL 2 TIMES DAILY
Qty: 180 TABLET | Refills: 3 | Status: SHIPPED | OUTPATIENT
Start: 2022-03-18 | End: 2022-07-06

## 2022-03-18 RX ORDER — LAMOTRIGINE 150 MG/1
150 TABLET ORAL EVERY MORNING
Qty: 30 TABLET | Refills: 3 | Status: SHIPPED | OUTPATIENT
Start: 2022-03-18 | End: 2022-07-06

## 2022-03-18 RX ORDER — LAMOTRIGINE 100 MG/1
100 TABLET ORAL EVERY MORNING
Qty: 90 TABLET | Refills: 3 | Status: SHIPPED | OUTPATIENT
Start: 2022-03-18 | End: 2022-07-06

## 2022-03-18 NOTE — TELEPHONE ENCOUNTER
TOPIRAMATE TABS 50MG  Last Written Prescription Date:  4/28/2021  Last Fill Quantity: 540,   # refills: 3  Last Office Visit :  4/28/2021  Future Office visit: None  Office visit due, 30 day saundra sent to pharm,  Clinic notified  And sending to Provider to confirm orders are correct as prescribed    LAMOTRIGINE TABS 150MG  Last Written Prescription Date:  4/28/2021  Last Fill Quantity: 540,   # refills: 3  Last Office Visit :  4/28/2021  Future Office visit: None  Office visit due, 30 day saundra sent to pharm,  Clinic notified  And sending to Provider to confirm orders are correct as prescribed    LAMOTRIGINE TABS 100MG  Last Written Prescription Date:  4/28/2021  Last Fill Quantity: 540,   # refills: 3  Last Office Visit :  4/28/2021  Future Office visit: None  Office visit due, 30 day saundra sent to pharm,  Clinic notified  And sending to Provider to confirm orders are correct as prescribed      Neeru Dunn RN  Central Triage Red Flags/Med Refills

## 2022-03-25 ENCOUNTER — TELEPHONE (OUTPATIENT)
Dept: NEUROLOGY | Facility: CLINIC | Age: 82
End: 2022-03-25
Payer: COMMERCIAL

## 2022-03-25 NOTE — TELEPHONE ENCOUNTER
LVM to schedule return appt w Dr. Cain, rx refill request, last seen 4/28/21, provided clinic number for call back.

## 2022-04-06 ENCOUNTER — TELEPHONE (OUTPATIENT)
Dept: NEUROLOGY | Facility: CLINIC | Age: 82
End: 2022-04-06
Payer: COMMERCIAL

## 2022-04-06 NOTE — TELEPHONE ENCOUNTER
Pt is confused on how much medication he has left. Please call back to discuss if he has enough medication to last until his appt in May. Please call back.

## 2022-04-28 DIAGNOSIS — G40.209 LOCALIZATION-RELATED PARTIAL EPILEPSY WITH COMPLEX PARTIAL SEIZURES (H): ICD-10-CM

## 2022-04-28 DIAGNOSIS — R20.9 DISTURBANCE OF SKIN SENSATION: ICD-10-CM

## 2022-04-28 DIAGNOSIS — G40.209 PARTIAL EPILEPSY WITH IMPAIRMENT OF CONSCIOUSNESS (H): ICD-10-CM

## 2022-04-29 RX ORDER — CLONAZEPAM 0.5 MG/1
TABLET ORAL
Qty: 270 TABLET | Refills: 3 | Status: SHIPPED | OUTPATIENT
Start: 2022-04-29 | End: 2022-07-06

## 2022-04-29 NOTE — TELEPHONE ENCOUNTER
clonazePAM (KLONOPIN) 0.5 MG tablet   0.5mg AM and 1 mg PM     Last Written Prescription Date:  11/12/21  Last Fill Quantity: 270,   # refills: 1  Last Office Visit : 4/28/21  Future Office visit:  5/18/22    Routing refill request to provider for review/approval because:  Drug not on the [facility], P or [facility] refill protocol or controlled substance

## 2022-07-06 ENCOUNTER — VIRTUAL VISIT (OUTPATIENT)
Dept: NEUROLOGY | Facility: CLINIC | Age: 82
End: 2022-07-06

## 2022-07-06 VITALS — WEIGHT: 155 LBS | HEIGHT: 72 IN | BODY MASS INDEX: 20.99 KG/M2

## 2022-07-06 DIAGNOSIS — R20.9 DISTURBANCE OF SKIN SENSATION: ICD-10-CM

## 2022-07-06 DIAGNOSIS — G40.209 LOCALIZATION-RELATED PARTIAL EPILEPSY WITH COMPLEX PARTIAL SEIZURES (H): ICD-10-CM

## 2022-07-06 DIAGNOSIS — G40.209 PARTIAL EPILEPSY WITH IMPAIRMENT OF CONSCIOUSNESS (H): ICD-10-CM

## 2022-07-06 RX ORDER — CLONAZEPAM 0.5 MG/1
TABLET ORAL
Qty: 270 TABLET | Refills: 1 | Status: SHIPPED | OUTPATIENT
Start: 2022-07-06 | End: 2022-11-16

## 2022-07-06 RX ORDER — TOPIRAMATE 50 MG/1
150 TABLET, FILM COATED ORAL 2 TIMES DAILY
Qty: 540 TABLET | Refills: 3 | Status: SHIPPED | OUTPATIENT
Start: 2022-07-06 | End: 2022-11-16

## 2022-07-06 RX ORDER — LAMOTRIGINE 100 MG/1
TABLET ORAL
Qty: 270 TABLET | Refills: 3 | Status: SHIPPED | OUTPATIENT
Start: 2022-07-06 | End: 2022-11-16

## 2022-07-06 RX ORDER — LAMOTRIGINE 150 MG/1
150 TABLET ORAL EVERY MORNING
Qty: 90 TABLET | Refills: 3 | Status: SHIPPED | OUTPATIENT
Start: 2022-07-06 | End: 2022-11-16

## 2022-07-06 ASSESSMENT — PATIENT HEALTH QUESTIONNAIRE - PHQ9: SUM OF ALL RESPONSES TO PHQ QUESTIONS 1-9: 0

## 2022-07-06 NOTE — PATIENT INSTRUCTIONS
Follow up  1 year     Continue seizure medications     Lamotrigine 250 mg morning and 200 mg evening   Topiramate 150 mg twice a day (50 mg tablet)   Klonopin  0.5 mg tablet - 1 tablet morning and 2 tablet night    May drive     Yarelis Cain MD

## 2022-07-06 NOTE — PROGRESS NOTES
Ronny is a 82 year old who is being evaluated via a billable telephone visit.      What phone number would you like to be contacted at? [phone] would you like to obtain your AVS? DILIA Larsen    Phone call duration: 12 minutes      [facility] Epilepsy Care Progress Note    Patient:  Ronny Rinaldi  :  1940   Age:  82 year old   Today's Office Visit:  2022     HPI: Since our last visit patient had no seizures. Last saw him 2021. He had no more syncope spells,  Last syncope spell was ER visit 2020 for syncope (he thinks this was secondary to a new medication) and 2020 he had  syncope due to valium (4 tab) given  for prostate biopsy. Prostate cancer is still in remission. He has numbness in lower extremities. I encouraged him to see primary care provider for lower extremities  Numbness.     He has remained seizure free since . Currently, on antiepileptic drugs there is no fatigue, no upset stomach , no dizziness, no double vision  or no mood changes. On this visit we spoke about patient's seizures, antiepileptic drug, and plan of epilepsy are. Patient/caregiver was agreeable with plan of care.     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 The patient has been on polytherapy with antiepileptic drug. His LTG level is high at 12, but, he does not want to lower LTG due to  seizures risk.  He had a SDH in 7/2016 from a fall.He has been seizure free since 11/2005.     We will continue same antiepileptic drug with no changes. Klonopin was reduced in the past and he had breakthrough seizure.  Continue antiepileptic drug with no changes.         Plan:   Check antiepileptic drug levels (LTG, TPM), AST, ALT for efficacy, toxicity, and side effects.   Patient has a complex epilepsy condition and requires lab monitoring to avoid side effects that may be harmful    Renewed seizure medications for one year   Lamotrigine 250 mg morning and 200 mg evening   Topiramate 150 mg twice a day (50 mg tablet)   Klonopin  0.5 mg tablet - 1 tablet morning and 2 tablet night    He has pacemaker, so we have not repeated MRI brain    Follow up 1 year     Check labs     Yarelis Cain MD   Epilepsy Staff

## 2022-07-06 NOTE — LETTER
2022     RE: Ronny Rinaldi  : 1940   MRN: [id number]      Dear Colleague,    Thank you for referring your patient, Ronny Rinaldi, to the [facility] EPILEPSY CARE at [facility]. Please see a copy of my visit note below.    Ronny is a 82 year old who is being evaluated via a billable telephone visit.      What phone number would you like to be contacted at? [phone] would you like to obtain your AVS? DILIA Larsen    Phone call duration: 12 minutes      [facility] Epilepsy Care Progress Note    Patient:  Ronny Rinaldi  :  1940   Age:  82 year old   Today's Office Visit:  2022     HPI: Since our last visit patient had no seizures. Last saw him 2021. He had no more syncope spells,  Last syncope spell was ER visit 2020 for syncope (he thinks this was secondary to a new medication) and 2020 he had  syncope due to valium (4 tab) given  for prostate biopsy. Prostate cancer is still in remission. He has numbness in lower extremities. I encouraged him to see primary care provider for lower extremities  Numbness.     He has remained seizure free since . Currently, on antiepileptic drugs there is no fatigue, no upset stomach , no dizziness, no double vision  or no mood changes. On this visit we spoke about patient's seizures, antiepileptic drug, and plan of epilepsy are. Patient/caregiver was agreeable with plan of care.     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 The patient has been on polytherapy with antiepileptic drug. His LTG level is high at 12, but, he does not want to lower LTG due to seizures risk.  He had a SDH in 7/2016 from a fall.He has been seizure free since 11/2005.     We will continue same antiepileptic drug with no changes. Klonopin was reduced in the past and he had breakthrough seizure.  Continue antiepileptic drug with no changes.       Plan:   Check antiepileptic drug levels (LTG, TPM), AST, ALT for efficacy, toxicity, and side effects.   Patient has a complex epilepsy condition and requires lab monitoring to avoid side effects that may be harmful    Renewed seizure medications for one year   Lamotrigine 250 mg morning and 200 mg evening   Topiramate 150 mg twice a day (50 mg tablet)   Klonopin  0.5 mg tablet - 1 tablet morning and 2 tablet night    He has pacemaker, so we have not repeated MRI brain    Follow up 1 year     Check labs     Yarelis Cain MD   Epilepsy Staff

## 2022-11-10 DIAGNOSIS — G40.209 PARTIAL EPILEPSY WITH IMPAIRMENT OF CONSCIOUSNESS (H): ICD-10-CM

## 2022-11-10 DIAGNOSIS — R20.9 DISTURBANCE OF SKIN SENSATION: ICD-10-CM

## 2022-11-10 DIAGNOSIS — G40.209 LOCALIZATION-RELATED PARTIAL EPILEPSY WITH COMPLEX PARTIAL SEIZURES (H): ICD-10-CM

## 2022-11-10 NOTE — TELEPHONE ENCOUNTER
Ronny Rinaldi is requesting a year refill on medications. Patient was scheduled for a visit with Dr. Cain on 11/10/22 but had to reschedule due to provider being [facility]. Patient rescheduled his appointment to in person, 12/14/22 but will be out of medications prior to that date. Patient is asking that a year supply of meds be sent to pharmacy on file.

## 2022-11-16 RX ORDER — TOPIRAMATE 50 MG/1
150 TABLET, FILM COATED ORAL 2 TIMES DAILY
Qty: 540 TABLET | Refills: 3 | Status: SHIPPED | OUTPATIENT
Start: 2022-11-16 | End: 2023-07-05

## 2022-11-16 RX ORDER — CLONAZEPAM 0.5 MG/1
TABLET ORAL
Qty: 270 TABLET | Refills: 1 | Status: SHIPPED | OUTPATIENT
Start: 2022-11-16 | End: 2023-07-05

## 2022-11-16 RX ORDER — LAMOTRIGINE 100 MG/1
TABLET ORAL
Qty: 270 TABLET | Refills: 3 | Status: SHIPPED | OUTPATIENT
Start: 2022-11-16 | End: 2023-07-05

## 2022-11-16 RX ORDER — LAMOTRIGINE 150 MG/1
150 TABLET ORAL EVERY MORNING
Qty: 90 TABLET | Refills: 3 | Status: SHIPPED | OUTPATIENT
Start: 2022-11-16 | End: 2023-07-05

## 2022-12-14 ENCOUNTER — OFFICE VISIT (OUTPATIENT)
Dept: NEUROLOGY | Facility: CLINIC | Age: 82
End: 2022-12-14
Payer: COMMERCIAL

## 2022-12-14 VITALS
WEIGHT: 185 LBS | HEIGHT: 72 IN | TEMPERATURE: 97.4 F | BODY MASS INDEX: 25.06 KG/M2 | SYSTOLIC BLOOD PRESSURE: 131 MMHG | DIASTOLIC BLOOD PRESSURE: 78 MMHG | HEART RATE: 80 BPM

## 2022-12-14 DIAGNOSIS — G40.209 PARTIAL EPILEPSY WITH IMPAIRMENT OF CONSCIOUSNESS (H): Primary | ICD-10-CM

## 2022-12-14 DIAGNOSIS — G40.209 LOCALIZATION-RELATED PARTIAL EPILEPSY WITH COMPLEX PARTIAL SEIZURES (H): ICD-10-CM

## 2022-12-14 DIAGNOSIS — R20.9 DISTURBANCE OF SKIN SENSATION: ICD-10-CM

## 2022-12-14 ASSESSMENT — PAIN SCALES - GENERAL: PAINLEVEL: NO PAIN (0)

## 2022-12-14 NOTE — PROGRESS NOTES
"[facility] Epilepsy Care Progress Note    Patient:  Ronny Rinaldi  :  1940   Age:  82 year old   Today's Office Visit:  2022    HPI: Since our last visit patient had no seizures. End of 2022 he was in [place] his \"legs gave out, felt drunk, unstable gait, and collapsed\". This lasted 10 minutes, his systolic BP 58. He went to ER and was told \"nothing was wrong\". The next morning he passed out again and his systolic BP dropped again 70.   They saw cardiologist and pace maker is fine. Primary care provider recommended PT.     He had syncope spell was ER visit 2020 for syncope (he thinks this was secondary to a new medication) and 2020 he had  syncope due to valium (4 tab) given  for prostate biopsy. Prostate cancer is still in remission. He has numbness in lower extremities.     He has remained seizure free since . Currently, on antiepileptic drugs there is no fatigue, no upset stomach , no dizziness, no double vision  or no mood changes. On this visit we spoke about patient's seizures, antiepileptic drug, and plan of epilepsy are. Patient/caregiver was agreeable with plan of care.     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 The patient has been on polytherapy with antiepileptic drug. His LTG level is high at 12, but, he does not want to lower LTG due to seizures risk.  He had a SDH in " 7/2016 from a fall. He has been seizure free since 11/2005.     9/2022 he had two episodes of hypotension with loss of consciousness and feeling drunk/legs gave out. He was on vacation and had too much alcohol and less food. He declined carotid ultrasound. I encouraged hydration and increase salt intake.     We will continue same antiepileptic drug with no changes. Klonopin was reduced in the past and he had breakthrough seizure.  Continue antiepileptic drug with no changes.       Plan:   Renewed seizure medications for one year   Lamotrigine 250 mg morning and 200 mg evening   Topiramate 150 mg twice a day (50 mg tablet)   Klonopin  0.5 mg tablet - 1 tablet morning and 2 tablet night    I recommended lowering lamotrigine 200 mg twice a day and they were too nervous to reduce it    He has pacemaker, so we he cant not get MRI brain    Follow up 1 year     Check labs     Recommended carotid ultrasound (they declined)     Fall prevention with PT and primary care provider     Yarelis Cain MD   Epilepsy Staff

## 2022-12-14 NOTE — PATIENT INSTRUCTIONS
Renewed seizure medications for one year   Lamotrigine 250 mg morning and 200 mg evening   Topiramate 150 mg twice a day (50 mg tablet)   Klonopin  0.5 mg tablet - 1 tablet morning and 2 tablet night    Follow up 1 year     Check labs     Recommended carotid ultrasound (they declined)     Fall prevention with PT and primary care provider     Yarelis Cain MD

## 2022-12-14 NOTE — LETTER
"2022     RE: Ronny Rinaldi  : 1940   MRN: [id number]      Dear Colleague,    Thank you for referring your patient, Ronny Rinaldi, to the [facility] EPILEPSY CARE at [facility]. Please see a copy of my visit note below.    [facility] Epilepsy Care Progress Note    Patient:  Ronny Rinaldi  :  1940   Age:  82 year old   Today's Office Visit:  2022    HPI: Since our last visit patient had no seizures. End of 2022 he was in [place] his \"legs gave out, felt drunk, unstable gait, and collapsed\". This lasted 10 minutes, his systolic BP 58. He went to ER and was told \"nothing was wrong\". The next morning he passed out again and his systolic BP dropped again 70.   They saw cardiologist and pace maker is fine. Primary care provider recommended PT.     He had syncope spell was ER visit 2020 for syncope (he thinks this was secondary to a new medication) and 2020 he had  syncope due to valium (4 tab) given  for prostate biopsy. Prostate cancer is still in remission. He has numbness in lower extremities.     He has remained seizure free since . Currently, on antiepileptic drugs there is no fatigue, no upset stomach , no dizziness, no double vision  or no mood changes. On this visit we spoke about patient's seizures, antiepileptic drug, and plan of epilepsy are. Patient/caregiver was agreeable with plan of care.     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 The patient has been on polytherapy with antiepileptic drug. His LTG level is high at 12, but, he does not want to lower LTG due to seizures risk.  He had a SDH in 7/2016 from a fall. He has been seizure free since 11/2005.     9/2022 he had two episodes of hypotension with loss of consciousness and feeling drunk/legs gave out. He was on vacation and had too much alcohol and less food. He declined carotid ultrasound. I encouraged hydration and increase salt intake.     We will continue same antiepileptic drug with no changes. Klonopin was reduced in the past and he had breakthrough seizure.  Continue antiepileptic drug with no changes.       Plan:   Renewed seizure medications for one year   Lamotrigine 250 mg morning and 200 mg evening   Topiramate 150 mg twice a day (50 mg tablet)   Klonopin  0.5 mg tablet - 1 tablet morning and 2 tablet night    I recommended lowering lamotrigine 200 mg twice a day and they were too nervous to reduce it    He has pacemaker, so we he cant not get MRI brain    Follow up 1 year     Check labs     Recommended carotid ultrasound (they declined)     Fall prevention with PT and primary care provider     Yarelis Cain MD   Epilepsy Staff

## 2023-07-05 ENCOUNTER — OFFICE VISIT (OUTPATIENT)
Dept: NEUROLOGY | Facility: CLINIC | Age: 83
End: 2023-07-05
Payer: COMMERCIAL

## 2023-07-05 VITALS
TEMPERATURE: 98.1 F | DIASTOLIC BLOOD PRESSURE: 77 MMHG | WEIGHT: 182.2 LBS | HEART RATE: 86 BPM | BODY MASS INDEX: 24.71 KG/M2 | SYSTOLIC BLOOD PRESSURE: 123 MMHG

## 2023-07-05 DIAGNOSIS — G40.209 PARTIAL EPILEPSY WITH IMPAIRMENT OF CONSCIOUSNESS (H): ICD-10-CM

## 2023-07-05 DIAGNOSIS — R20.9 DISTURBANCE OF SKIN SENSATION: ICD-10-CM

## 2023-07-05 DIAGNOSIS — G40.209 LOCALIZATION-RELATED PARTIAL EPILEPSY WITH COMPLEX PARTIAL SEIZURES (H): ICD-10-CM

## 2023-07-05 RX ORDER — LAMOTRIGINE 150 MG/1
150 TABLET ORAL EVERY EVENING
Qty: 90 TABLET | Refills: 3 | Status: SHIPPED | OUTPATIENT
Start: 2023-10-31 | End: 2024-07-09

## 2023-07-05 RX ORDER — TOPIRAMATE 50 MG/1
150 TABLET, FILM COATED ORAL 2 TIMES DAILY
Qty: 540 TABLET | Refills: 3 | Status: SHIPPED | OUTPATIENT
Start: 2023-10-31 | End: 2024-07-09

## 2023-07-05 RX ORDER — CLONAZEPAM 0.5 MG/1
TABLET ORAL
Qty: 270 TABLET | Refills: 1 | Status: SHIPPED | OUTPATIENT
Start: 2023-07-05 | End: 2023-11-09

## 2023-07-05 RX ORDER — LAMOTRIGINE 100 MG/1
TABLET ORAL
Qty: 270 TABLET | Refills: 3 | Status: SHIPPED | OUTPATIENT
Start: 2023-10-31 | End: 2024-03-19

## 2023-07-05 ASSESSMENT — PAIN SCALES - GENERAL: PAINLEVEL: NO PAIN (0)

## 2023-07-05 NOTE — PROGRESS NOTES
[facility]    Patient:  Ronny Rinaldi  :  1940   Age:  83 year old   Today's Office Visit:  2023      HPI: Accompanied with wife (Ani). Since our last visit patient had no seizures, but had syncope spell. Tolerating antiepileptic drug and no main side effects.  Prostate cancer is still in remission. He has numbness in lower extremities. He take lamotrigine 200-250 ,this is opposite of what is suppose to take. We can continue lamotrigine 200-150.      Syncope spell: 2023 (BP dropped and collapsed, he has orthostatic hypotension), prior to his 2022 ([place] his \"legs gave out, felt drunk, unstable gait, and collapsed\". This lasted 10 minutes, his systolic BP 58),  2020 syncope (he thinks this was secondary to a new medication) and 2020 he had  syncope due to valium (4 tab) given  for prostate biopsy.    He has remained seizure free since . Currently, on antiepileptic drugs there is no fatigue, no upset stomach , no dizziness, no double vision  or no mood changes. On this visit we spoke about patient's seizures, antiepileptic drug, and plan of epilepsy are. Patient/caregiver was agreeable with plan of care.     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   History of SDH from fall 2016    Discussion: Focal epilepsy with " impairment in awareness, controlled, secondary to history of left frontal lobe abscess. The patient has been on polytherapy with antiepileptic drug. His LTG level is high at 12, but, he does not want to lower LTG due to seizures risk.  He had a SDH in 7/2016 from a fall. He has been seizure free since 11/2005.     9/2022 he had two episodes of hypotension with loss of consciousness and feeling drunk/legs gave out. He was on vacation and had too much alcohol and less food. He declined carotid ultrasound. I encouraged hydration and increase salt intake.     We will continue same antiepileptic drug with no changes. Klonopin was reduced in the past and he had breakthrough seizure.  Continue antiepileptic drug with no changes.       Plan:   Renewed seizure medications for one year   Lamotrigine 200 mg morning and 250 mg evening   Topiramate 150 mg twice a day (50 mg tablet)   Klonopin  0.5 mg tablet - 1 tablet morning and 2 tablet night    Talk to cardiologist for midodrine or florinef for orthostatic hypotension, also an occupational therapist for home safety and fall prevention. You can ask your primary care provider about fall prevention. Increase water intake.     He has pacemaker, so we he cant not get MRI brain    Follow up 1 year (video)    Check labs      Fall prevention with PT and primary care provider     Yarelis Cain MD   Epilepsy Staff

## 2023-07-05 NOTE — LETTER
"2023       RE: Ronny Rinaldi  : 1940   MRN: [id number]        Dear Colleague,    Thank you for referring your patient, Ronny Rinaldi, to the [facility] EPILEPSY CARE at [facility]. Please see a copy of my visit note below.    [facility] Epilepsy Care Progress Note    Patient:  Ronny Rinaldi  :  1940   Age:  83 year old   Today's Office Visit:  2023      HPI: Accompanied with wife (Ani). Since our last visit patient had no seizures, but had syncope spell. Tolerating antiepileptic drug and no main side effects.  Prostate cancer is still in remission. He has numbness in lower extremities. He take lamotrigine 200-250 ,this is opposite of what is suppose to take. We can continue lamotrigine 200-150.      Syncope spell: 2023 (BP dropped and collapsed, he has orthostatic hypotension), prior to his 2022 ([place] his \"legs gave out, felt drunk, unstable gait, and collapsed\". This lasted 10 minutes, his systolic BP 58),  2020 syncope (he thinks this was secondary to a new medication) and 2020 he had  syncope due to valium (4 tab) given  for prostate biopsy.    He has remained seizure free since . Currently, on antiepileptic drugs there is no fatigue, no upset stomach , no dizziness, no double vision  or no mood changes. On this visit we spoke about patient's seizures, antiepileptic drug, and plan of epilepsy are. Patient/caregiver was agreeable with plan of care.     Current Antiepileptic drug:   Lamotrigine 200 mg morning and 250 mg evening   Topiramate 150 mg twice a day (50 mg tablet)   Klonopin  0.5 mg tablet - 1 tablet morning and 2 tablet night    Exam: /77 (BP Location: Right arm, Patient Position: Sitting, Cuff Size: Adult Regular)   Pulse 86   Temp 98.1  F (36.7  C) (Temporal)   Wt 182 lb 3.2 oz (82.6 kg)   BMI 24.71 kg/m   \Alert, orientated, speech is fluent    Impression:   Focal epilepsy with " impairment in awareness  History prostate cancer s/p radiation and Lupron  Tremors most likely secondary to lamotrigine   Increase risk of falls   History of paroxsymal atrial fibrillation and bradyarrthyma  S/p pacemaker 2016.   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 The patient has been on polytherapy with antiepileptic drug. His LTG level is high at 12, but, he does not want to lower LTG due to seizures risk.  He had a SDH in 7/2016 from a fall. He has been seizure free since 11/2005.     9/2022 he had two episodes of hypotension with loss of consciousness and feeling drunk/legs gave out. He was on vacation and had too much alcohol and less food. He declined carotid ultrasound. I encouraged hydration and increase salt intake.     We will continue same antiepileptic drug with no changes. Klonopin was reduced in the past and he had breakthrough seizure.  Continue antiepileptic drug with no changes.       Plan:   Renewed seizure medications for one year   Lamotrigine 200 mg morning and 250 mg evening   Topiramate 150 mg twice a day (50 mg tablet)   Klonopin  0.5 mg tablet - 1 tablet morning and 2 tablet night    Talk to cardiologist for midodrine or florinef for orthostatic hypotension, also an occupational therapist for home safety and fall prevention. You can ask your primary care provider about fall prevention. Increase water intake.     He has pacemaker, so we he cant not get MRI brain    Follow up 1 year (video)    Check labs      Fall prevention with PT and primary care provider           Again, thank you for allowing me to participate in the care of your patient.      Sincerely,    Yarelis Cain MD

## 2023-07-05 NOTE — PATIENT INSTRUCTIONS
Renewed seizure medications for one year   Lamotrigine 200 mg morning and 250 mg evening   Topiramate 150 mg twice a day (50 mg tablet)   Klonopin  0.5 mg tablet - 1 tablet morning and 2 tablet night    Talk to cardiologist for midodrine or florinef for orthostatic hypotension, also an occupational therapist for home safety and fall prevention. You can ask your primary care provider about fall prevention. Increase water intake.     He has pacemaker, so we he cant not get MRI brain    Follow up 1 year (video)    Check labs      Fall prevention with PT and primary care provider     Yarelis Cain MD

## 2023-11-09 DIAGNOSIS — R20.9 DISTURBANCE OF SKIN SENSATION: ICD-10-CM

## 2023-11-09 DIAGNOSIS — G40.209 LOCALIZATION-RELATED PARTIAL EPILEPSY WITH COMPLEX PARTIAL SEIZURES (H): ICD-10-CM

## 2023-11-09 DIAGNOSIS — G40.209 PARTIAL EPILEPSY WITH IMPAIRMENT OF CONSCIOUSNESS (H): ICD-10-CM

## 2023-11-09 RX ORDER — CLONAZEPAM 0.5 MG/1
TABLET ORAL
Qty: 270 TABLET | Refills: 1 | Status: SHIPPED | OUTPATIENT
Start: 2023-11-09 | End: 2024-02-19

## 2024-02-19 DIAGNOSIS — G40.209 PARTIAL EPILEPSY WITH IMPAIRMENT OF CONSCIOUSNESS (H): ICD-10-CM

## 2024-02-19 DIAGNOSIS — G40.209 LOCALIZATION-RELATED PARTIAL EPILEPSY WITH COMPLEX PARTIAL SEIZURES (H): ICD-10-CM

## 2024-02-19 DIAGNOSIS — R20.9 DISTURBANCE OF SKIN SENSATION: ICD-10-CM

## 2024-02-19 RX ORDER — CLONAZEPAM 0.5 MG/1
TABLET ORAL
Qty: 270 TABLET | Refills: 1 | Status: SHIPPED | OUTPATIENT
Start: 2024-02-19 | End: 2024-02-20

## 2024-02-20 NOTE — TELEPHONE ENCOUNTER
What is the concern that needs to be addressed by a nurse?     Need the clonazepam 0.5 MG tables change to [facility] [id number] [place] [address], [place] [address]     phone: [phone]    Will need 90 day supply    May a detailed message be left on voicemail? yes    Date of last office visit: 7-5-23    Message routed to: rn mincep pool

## 2024-02-21 RX ORDER — CLONAZEPAM 0.5 MG/1
TABLET ORAL
Qty: 270 TABLET | Refills: 1 | Status: SHIPPED | OUTPATIENT
Start: 2024-02-21 | End: 2024-07-09

## 2024-03-19 DIAGNOSIS — R20.9 DISTURBANCE OF SKIN SENSATION: ICD-10-CM

## 2024-03-19 DIAGNOSIS — G40.209 LOCALIZATION-RELATED PARTIAL EPILEPSY WITH COMPLEX PARTIAL SEIZURES (H): ICD-10-CM

## 2024-03-19 DIAGNOSIS — G40.209 PARTIAL EPILEPSY WITH IMPAIRMENT OF CONSCIOUSNESS (H): ICD-10-CM

## 2024-03-26 RX ORDER — LAMOTRIGINE 100 MG/1
TABLET ORAL
Qty: 270 TABLET | Refills: 0 | Status: SHIPPED | OUTPATIENT
Start: 2024-03-26 | End: 2024-07-09

## 2024-03-26 NOTE — TELEPHONE ENCOUNTER
lamoTRIgine (LAMICTAL) 100 MG tablet   270 tablet 3 10/31/2023     Last Office Visit : 7-5-2023  Future Office visit:  7-9-2024    Anti-Seizure Meds Protocol  [id number]/19/2024 10:27 AM   Protocol Details Review Authorizing provider's last note.    Normal CBC on file in past 26 months    Normal ALT or AST on file in past 26 months    Normal platelet count on file in past 26 months     Filling per [facility] medication refill protocols - seizure medications.  Not all labs required.    Labs completed on : 5-  Care everywhere  Complete Blood Count-W/Diff     Plan:   Renewed seizure medications for one year   Lamotrigine 200 mg morning and 250 mg evening   Topiramate 150 mg twice a day (50 mg tablet)   Klonopin  0.5 mg tablet - 1 tablet morning and 2 tablet night

## 2024-04-25 ENCOUNTER — TELEPHONE (OUTPATIENT)
Dept: NEUROLOGY | Facility: CLINIC | Age: 84
End: 2024-04-25

## 2024-05-14 DIAGNOSIS — G40.209 PARTIAL EPILEPSY WITH IMPAIRMENT OF CONSCIOUSNESS (H): ICD-10-CM

## 2024-05-14 DIAGNOSIS — G40.209 LOCALIZATION-RELATED PARTIAL EPILEPSY WITH COMPLEX PARTIAL SEIZURES (H): ICD-10-CM

## 2024-05-14 DIAGNOSIS — R20.9 DISTURBANCE OF SKIN SENSATION: ICD-10-CM

## 2024-05-14 NOTE — TELEPHONE ENCOUNTER
Patient should have refill available at pharmacy. Please have patient call pharmacy and request refill.

## 2024-05-15 RX ORDER — CLONAZEPAM 0.5 MG/1
TABLET ORAL
Qty: 270 TABLET | Refills: 1 | OUTPATIENT
Start: 2024-05-15

## 2024-07-09 ENCOUNTER — VIRTUAL VISIT (OUTPATIENT)
Dept: NEUROLOGY | Facility: CLINIC | Age: 84
End: 2024-07-09
Payer: COMMERCIAL

## 2024-07-09 DIAGNOSIS — R20.9 DISTURBANCE OF SKIN SENSATION: ICD-10-CM

## 2024-07-09 DIAGNOSIS — G40.209 LOCALIZATION-RELATED PARTIAL EPILEPSY WITH COMPLEX PARTIAL SEIZURES (H): ICD-10-CM

## 2024-07-09 DIAGNOSIS — G40.209 PARTIAL EPILEPSY WITH IMPAIRMENT OF CONSCIOUSNESS (H): ICD-10-CM

## 2024-07-09 RX ORDER — LAMOTRIGINE 150 MG/1
150 TABLET ORAL EVERY EVENING
Qty: 90 TABLET | Refills: 3 | Status: SHIPPED | OUTPATIENT
Start: 2024-07-09

## 2024-07-09 RX ORDER — LAMOTRIGINE 100 MG/1
TABLET ORAL
Qty: 270 TABLET | Refills: 3 | Status: SHIPPED | OUTPATIENT
Start: 2024-07-09

## 2024-07-09 RX ORDER — TOPIRAMATE 50 MG/1
150 TABLET, FILM COATED ORAL 2 TIMES DAILY
Qty: 540 TABLET | Refills: 3 | Status: SHIPPED | OUTPATIENT
Start: 2024-07-09

## 2024-07-09 RX ORDER — CLONAZEPAM 0.5 MG/1
TABLET ORAL
Qty: 270 TABLET | Refills: 1 | Status: SHIPPED | OUTPATIENT
Start: 2024-07-09

## 2024-07-09 NOTE — PROGRESS NOTES
"I spent 15 minutes with the patient on the phone obtaining a medical history, determined medical diagnosis and treatment plan, then patient was counseled on this treatment plan and diagnosis. I answered all her questions and concerns, and arranged follow up care during this time.       [facility] Epilepsy Care Progress Note    Patient:  oRnny Rinaldi  :  1940   Age:  84 year old   Today's Office Visit:  2024    HPI:Since our last visit patient had no seizures,  he has fallen a couple of times in the last year, no serious trauma. He stands up slowly and this prevents syncope. He wears compression stocking which is helpful. Tolerating antiepileptic drug and no main side effects.  Prostate cancer is still in remission. He take lamotrigine 200-250 ,this is opposite of what is suppose to take.     Syncope spell: 2023 (BP dropped and collapsed, he has orthostatic hypotension), prior to his 2022 ([place] his \"legs gave out, felt drunk, unstable gait, and collapsed\". This lasted 10 minutes, his systolic BP 58),  2020 syncope (he thinks this was secondary to a new medication) and 2020 he had  syncope due to valium (4 tab) given  for prostate biopsy.    He has remained seizure free since . Currently, on antiepileptic drugs there is no fatigue, no upset stomach , no dizziness, no double vision  or no mood changes. On this visit we spoke about patient's seizures, antiepileptic drug, and plan of epilepsy are. Patient/caregiver was agreeable with plan of care.     Current Antiepileptic drug:   Lamotrigine 200 mg morning and 250 mg evening   Topiramate 150 mg twice a day (50 mg tablet)   Klonopin  0.5 mg tablet - 1 tablet morning and 2 tablet night    Exam: There were no vitals taken for this visit. 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 The patient has been on polytherapy with antiepileptic drug. His LTG level is high at 12, but, he does not want to lower LTG due to seizures risk.  He had a SDH in 7/2016 from a fall. He has been seizure free since 11/2005.     9/2022 he had two episodes of hypotension with loss of consciousness and feeling drunk/legs gave out. He was on vacation and had too much alcohol and less food. I encouraged hydration and increase salt intake and slow movements.     We will continue same antiepileptic drug with no changes. Klonopin was reduced in the past and he had breakthrough seizure.  Continue antiepileptic drug with no changes.     During today's visit, I informed the patient that I will no longer be at [facility] after September 2024. Their care will be transitioned to another provider. I carefully reviewed the plan of care and medications with the patient, addressing all their questions and providing reassurance and support regarding the transfer of care. The patient is agreeable with this plan of care.    Plan:   Continue same antiepileptic drug   Lamotrigine 200 mg morning and 250 mg evening (100 mg tablet and 150 mg tablet)   Topiramate 150 mg twice a day (50 mg tablet)   Klonopin  0.5 mg tablet - 1 tablet morning and 2 tablet night    He has pacemaker, so we he cant not get MRI brain    Follow up 1 year with Erika or Shruti     Check labs next year     Yarelis Cain MD   Epilepsy Staff

## 2024-07-09 NOTE — PATIENT INSTRUCTIONS
Plan:   Continue same antiepileptic drug   Lamotrigine 200 mg morning and 250 mg evening (100 mg tablet and 150 mg tablet)   Topiramate 150 mg twice a day (50 mg tablet)   Klonopin  0.5 mg tablet - 1 tablet morning and 2 tablet night    He has pacemaker, so we he cant not get MRI brain    Follow up 1 year with Erika or Shruti     Check labs next year     Yarelis Cain MD   Epilepsy Staff

## 2024-07-09 NOTE — LETTER
"2024       RE: Ronny Rinaldi  : 1940   MRN: [id number]      Dear Colleague,    Thank you for referring your patient, Ronny Rinaldi, to the [facility] EPILEPSY CARE at [facility]. Please see a copy of my visit note below.    I spent 15 minutes with the patient on the phone obtaining a medical history, determined medical diagnosis and treatment plan, then patient was counseled on this treatment plan and diagnosis. I answered all her questions and concerns, and arranged follow up care during this time.       [facility] Epilepsy Care Progress Note    Patient:  Ronny Rinaldi  :  1940   Age:  84 year old   Today's Office Visit:  2024    HPI:Since our last visit patient had no seizures,  he has fallen a couple of times in the last year, no serious trauma. He stands up slowly and this prevents syncope. He wears compression stocking which is helpful. Tolerating antiepileptic drug and no main side effects.  Prostate cancer is still in remission. He take lamotrigine 200-250 ,this is opposite of what is suppose to take.     Syncope spell: 2023 (BP dropped and collapsed, he has orthostatic hypotension), prior to his 2022 ([place] his \"legs gave out, felt drunk, unstable gait, and collapsed\". This lasted 10 minutes, his systolic BP 58),  2020 syncope (he thinks this was secondary to a new medication) and 2020 he had  syncope due to valium (4 tab) given  for prostate biopsy.    He has remained seizure free since . Currently, on antiepileptic drugs there is no fatigue, no upset stomach , no dizziness, no double vision  or no mood changes. On this visit we spoke about patient's seizures, antiepileptic drug, and plan of epilepsy are. Patient/caregiver was agreeable with plan of care.     Current Antiepileptic drug:   Lamotrigine 200 mg morning and 250 mg evening   Topiramate 150 mg twice a day (50 mg tablet)   Klonopin  0.5 mg " tablet - 1 tablet morning and 2 tablet night    Exam: There were no vitals taken for this visit. 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 The patient has been on polytherapy with antiepileptic drug. His LTG level is high at 12, but, he does not want to lower LTG due to seizures risk.  He had a SDH in 7/2016 from a fall. He has been seizure free since 11/2005.     9/2022 he had two episodes of hypotension with loss of consciousness and feeling drunk/legs gave out. He was on vacation and had too much alcohol and less food. I encouraged hydration and increase salt intake and slow movements.     We will continue same antiepileptic drug with no changes. Klonopin was reduced in the past and he had breakthrough seizure.  Continue antiepileptic drug with no changes.     During today's visit, I informed the patient that I will no longer be at [facility] after September 2024. Their care will be transitioned to another provider. I carefully reviewed the plan of care and medications with the patient, addressing all their questions and providing reassurance and support regarding the transfer of care. The patient is agreeable with this plan of care.    Plan:   Continue same antiepileptic drug   Lamotrigine 200 mg morning and 250 mg evening (100 mg tablet and 150 mg tablet)   Topiramate 150 mg twice a day (50 mg tablet)   Klonopin  0.5 mg tablet - 1 tablet morning and 2 tablet night    He has pacemaker, so we he cant not get MRI brain    Follow up 1 year with Erika or Shruti     Check labs next  year     Yarelis Cain MD   Epilepsy Staff        Again, thank you for allowing me to participate in the care of your patient.      Sincerely,    Yarelis Cain MD

## 2024-07-09 NOTE — NURSING NOTE
Is the patient currently in the state of [place]? YES    Visit mode:TELEPHONE    If the visit is dropped, the patient can be reconnected by: TELEPHONE VISIT: Phone number: [phone]    Will anyone else be joining the visit? NO      How would you like to obtain your AVS? Mail a copy    Are changes needed to the allergy or medication list? NO    Reason for visit: Follow Up    When I asked the patient the PHQ 2 questions - he mentioned that he needs to wear compression stockings when he golf's now.    Domenica Valdez, CMA

## 2024-12-19 DIAGNOSIS — G40.209 LOCALIZATION-RELATED PARTIAL EPILEPSY WITH COMPLEX PARTIAL SEIZURES (H): ICD-10-CM

## 2024-12-19 DIAGNOSIS — G40.209 PARTIAL EPILEPSY WITH IMPAIRMENT OF CONSCIOUSNESS (H): ICD-10-CM

## 2024-12-19 DIAGNOSIS — R20.9 DISTURBANCE OF SKIN SENSATION: ICD-10-CM

## 2024-12-20 NOTE — TELEPHONE ENCOUNTER
clonazePAM Oral Tablet 0.5 MG       Last Written Prescription Date:  7-9-24  Last Fill Quantity: 270,   # refills: 1  Last Office Visit : 7-9-24  Future Office visit:  4-10-25 Dr. Gaffney    Routing refill request to provider for review/approval because:  Drug not on the [facility], P or [facility] refill protocol or controlled substance

## 2024-12-30 NOTE — TELEPHONE ENCOUNTER
Reviewed chart. Last office visit indicates lowered dosing of Klonopin resulted in breakthrough seizure.  shows last refill was for #270 on 10/30/2024, so should have enough until end of January (even with a mail order, one month early is too soon).     Reviewed with Dr. Gaffney. Are we able to schedule a video visit with myself or Erika in January so that someone is familiar with his case with Dr. Cain's resignation, and transfer with Dr. Gaffney not until April please?       Shruti Patiño PA-C

## 2025-01-02 NOTE — TELEPHONE ENCOUNTER
Writer received call back from patient.  He states that he is currently in [place] and went through his medication and rpeorts having exactly 2 months worth from today.  He states that it does take several weeks to get to him when he orders it from the pharmacy and so he is trying to be proactive regarding the refill.    He states he is returning from [place] on 4/10

## 2025-01-02 NOTE — TELEPHONE ENCOUNTER
Writer called patient to discuss refill request.  Received voicemail, left message asking for a return call.  Clinic number provided.

## 2025-01-03 NOTE — TELEPHONE ENCOUNTER
Noted. Keep appointment as scheduled with Dr. Gaffney as soonest patient is available in state. Still too soon for refill, would be willing to refill in 2 weeks.    Shruti Patiño PA-C

## 2025-01-20 RX ORDER — CLONAZEPAM 0.5 MG/1
TABLET ORAL
Qty: 270 TABLET | Refills: 0 | Status: SHIPPED | OUTPATIENT
Start: 2025-02-16

## 2025-03-24 NOTE — PATIENT INSTRUCTIONS
At your visit today, we discussed your risk for falls and preventive options.    Fall Prevention  Falls often occur due to slipping, tripping or losing your balance. Millions of people fall every year and injure themselves. Here are ways to reduce your risk of falling again.    Think about your fall, was there anything that caused your fall that can be fixed, removed, or replaced?    Make your home safe by keeping walkways clear of objects you may trip over.    Use non-slip pads under rugs. Do not use area rugs or small throw rugs.    Use non-slip mats in bathtubs and showers.    Install handrails and lights on staircases.    Do not walk in poorly lit areas.    Do not stand on chairs or wobbly ladders.    Use caution when reaching overhead or looking upward. This position can cause a loss of balance.    Be sure your shoes fit properly, have non-slip bottoms and are in good condition.     Wear shoes both inside and out. Avoid going barefoot or wearing slippers.    Be cautious when going up and down stairs, curbs, and when walking on uneven sidewalks.    If your balance is poor, consider using a cane or walker.    If your fall was related to alcohol use, stop or limit alcohol intake.     If your fall was related to use of sleeping medicines, talk to your doctor about this. You may need to reduce your dosage at bedtime if you awaken during the night to go to the bathroom.      To reduce the need for nighttime bathroom trips:  ? Avoid drinking fluids for several hours before going to bed  ? Empty your bladder before going to bed  ? Men can keep a urinal at the bedside    Stay as active as you can. Balance, flexibility, strength, and endurance all come from exercise. They all play a role in preventing falls. Ask your healthcare provider which types of activity are right for you.    Get your vision checked on a regular basis.    If you have pets, know where they are before you stand up or walk so you don't trip over  Provider Elaine NEPH/NAHOMI   Date form sent to provider: 3/ 24/ 2025   Comments       them.    Use night lights.  Date Last Reviewed: 11/5/2015 2000-2017 [employer] [address], [place] [address]. All rights reserved. This information is not intended as a substitute for professional medical care. Always follow your healthcare professional's instructions.          At your visit today, we discussed your risk for falls and preventive options.    Fall Prevention  Falls often occur due to slipping, tripping or losing your balance. Millions of people fall every year and injure themselves. Here are ways to reduce your risk of falling again.    Think about your fall, was there anything that caused your fall that can be fixed, removed, or replaced?    Make your home safe by keeping walkways clear of objects you may trip over.    Use non-slip pads under rugs. Do not use area rugs or small throw rugs.    Use non-slip mats in bathtubs and showers.    Install handrails and lights on staircases.    Do not walk in poorly lit areas.    Do not stand on chairs or wobbly ladders.    Use caution when reaching overhead or looking upward. This position can cause a loss of balance.    Be sure your shoes fit properly, have non-slip bottoms and are in good condition.     Wear shoes both inside and out. Avoid going barefoot or wearing slippers.    Be cautious when going up and down stairs, curbs, and when walking on uneven sidewalks.    If your balance is poor, consider using a cane or walker.    If your fall was related to alcohol use, stop or limit alcohol intake.     If your fall was related to use of sleeping medicines, talk to your doctor about this. You may need to reduce your dosage at bedtime if you awaken during the night to go to the bathroom.      To reduce the need for nighttime bathroom trips:  ? Avoid drinking fluids for several hours before going to bed  ? Empty your bladder before going to bed  ? Men can keep a urinal at the bedside    Stay as active as you can. Balance,  flexibility, strength, and endurance all come from exercise. They all play a role in preventing falls. Ask your healthcare provider which types of activity are right for you.    Get your vision checked on a regular basis.    If you have pets, know where they are before you stand up or walk so you don't trip over them.    Use night lights.  Date Last Reviewed: 11/5/2015 2000-2017 [employer]. [address] [place] [address], [place] [address]. All rights reserved. This information is not intended as a substitute for professional medical care. Always follow your healthcare professional's instructions.

## 2025-04-10 ENCOUNTER — OFFICE VISIT (OUTPATIENT)
Dept: NEUROLOGY | Facility: CLINIC | Age: 85
End: 2025-04-10
Payer: COMMERCIAL

## 2025-04-10 VITALS
RESPIRATION RATE: 20 BRPM | DIASTOLIC BLOOD PRESSURE: 73 MMHG | TEMPERATURE: 98 F | HEART RATE: 94 BPM | SYSTOLIC BLOOD PRESSURE: 121 MMHG | OXYGEN SATURATION: 95 %

## 2025-04-10 DIAGNOSIS — G40.209 LOCALIZATION-RELATED PARTIAL EPILEPSY WITH COMPLEX PARTIAL SEIZURES (H): ICD-10-CM

## 2025-04-10 DIAGNOSIS — R20.9 DISTURBANCE OF SKIN SENSATION: ICD-10-CM

## 2025-04-10 DIAGNOSIS — G40.209 PARTIAL EPILEPSY WITH IMPAIRMENT OF CONSCIOUSNESS (H): Primary | ICD-10-CM

## 2025-04-10 RX ORDER — LAMOTRIGINE 150 MG/1
150 TABLET ORAL EVERY EVENING
Qty: 90 TABLET | Refills: 3 | Status: SHIPPED | OUTPATIENT
Start: 2025-04-10

## 2025-04-10 RX ORDER — CLONAZEPAM 0.5 MG/1
TABLET ORAL
Qty: 270 TABLET | Refills: 1 | Status: SHIPPED | OUTPATIENT
Start: 2025-04-10

## 2025-04-10 RX ORDER — TOPIRAMATE 50 MG/1
150 TABLET, FILM COATED ORAL 2 TIMES DAILY
Qty: 540 TABLET | Refills: 3 | Status: SHIPPED | OUTPATIENT
Start: 2025-04-10

## 2025-04-10 RX ORDER — LAMOTRIGINE 100 MG/1
TABLET ORAL
Qty: 270 TABLET | Refills: 3 | Status: SHIPPED | OUTPATIENT
Start: 2025-04-10

## 2025-04-10 ASSESSMENT — PAIN SCALES - GENERAL: PAINLEVEL_OUTOF10: NO PAIN (0)

## 2025-04-10 NOTE — PROGRESS NOTES
[facility] Epilepsy Clinic: RETURN VISIT         Service Date: 04/10/2025    HISTORY: Mr. Ronny Rinaldi is an 85-year-old, right-handed man who returned for follow-up of focal epilepsy.  He came with his wife to the visit today.  The patient previously was followed by Dr. Yarelis Cain.    Following the most recent visit with Dr. Cain on 07/09/2024, the patient reportedly has had no seizures and no medication adverse effects.      He is quite concerned that none of his antiseizure medications would be reduced.  He recalls that his most recent seizure was a grand mal seizure in November 2008, which occurred when one of the medications was being reduced.  He has been on the same antiseizure medication regimen at the same doses since that time.    Ictal semiology-history:  Seizures began in 1994, and most recently occurred in 2005, according to physician progress notes, or in 2008, according ot the patient.    Seizure type 1: Generalized tonic-clonic seizure.  The patient recalls that when grand mal seizures began when he while awake, his aura occurred briefly.  His wife recalls seeing events with generalized stiffness and bilateral jerking with unresponsiveness lasting a minute or 2, followed by severe confusion.  They estimaate that 20-25 grand mal seizures occurred between 1994 and 2005 (or 2008).    Seizure type 2: Focal impaired seizure.  Old medical records list a history of complex partial seizures, but the patient and his wife do not recall anything about nonconvulsive seizures with impaired awareness.    Seizure type 3: Isolated aura.  The patient recalls that at times he had isolated auras consisting of sudden onset of a strange feeling that he could not further describe lasting perhaps a few seconds, and stereotyped from 1 event to the next.  He does not think that this occurred after 2008.      Epilepsy-seizure predispositions:  The patient has no family history of epilepsy or seizures.    A left frontal  "abscess was diagnosed in , and onset of seizures was in .  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  He underwent a hue hole with drainage followed by antibiotic therapy.  Six months after the abscess was diagnosed he began having seizures.      He has no history of gestational or  injury, febrile convulsions, developmental delay, stroke, meningitis,  encephalitis, significant head injury, or other epileptic predispositions.    Laboratory evaluations:  A brain MRI at [facility], on 2021, was reported to show:   1. No acute intracranial abnormality.  2. Remote insults involving the left greater than right frontal lobes with associated remote hemorrhage involving the left frontal lobe.  3. Remote blood probably associated with a prior right convexity extra-axial hemorrhage.  4. Senescent changes of global cerebral volume loss and chronic microvascular ischemia.\"    Outpatient video electroencephalography at [facility], on 2001, was reported to show sharp and slow waves independently in the left frontal and right temporal areas.     Epilepsy therapeutics:  The patient initially was treated with phenytoin and later with combinations of carbamazepine, divalproex and gabapentin.  He became seizure-free on lamotrigine, topiramate and clonazepam.    PAST MEDICAL-SURGICAL HISTORY:  1. Focal epilepsy.  2. Status post drainage of left frontal lobe abscess ().  3. Sick sinus syndrome and atrial fibrillation; status post pacemaker placement; chronic anticoagulation.    FAMILY HISTORY: There is no family history of seizures or epilepsy, or of other neurological conditions.    PERSONAL AND SOCIAL HISTORY: The patient grew up in [place].  He obtained a bachelor's degree in electrical engineering.  He worked in [employer], until he retired at about 56 years of age with disability.  He lives with his wife.    REVIEW OF SYSTEMS: His wife " noted that he has some difficulties with slowed thinking and poor memory.    MEDICATIONS: Lamotrigine 200/250 mg daily, topiramate 150 mg b.I.d., clonazepam 0/5/1.0 mg daily, apixaban, and other medications as per the electronic medical record.    PHYSICAL EXAMINATION:  On physical examination the patient appeared well nourished and in no acute distress. Vital signs were as per the electronic medical record.  Skull was consistent with reported surgery. Neck was supple, without signs of meningeal irritation.  On neurological examination, the patient appeared alert and was fully oriented to person, place, time, and reason for visit.  Speech showed normal articulation, fluency, repetitions, naming, syntax and comprehension.  Cranial nerves III through XII were normal.  Muscle masses, tones and strengths were normal throughout. There was no pronator drift.   No spontaneous tremors, myoclonus, or other abnormal movements were observed.  Sensations of light touch were reportedly normal throughout, except reduced over the feet in shifting zones.  Responses to vibratory testing were inconsistent at the ankles and feet,   The finger-nose-finger were performed normally bilaterally.  Romberg maneuver was positive, with swaying.  Regular walking was wide-based and slow, with decreased arm swing.  Deep tendon reflexes were normal, except reduced at the Achilles tendons. Toes were downgoing bilaterally.    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  Seizures have been fully controlled for longer than 15 years on a 3 drug regimen that reportedly causes no adverse effects.    The patient has some gait abnormalities that might be caused by frontal lobe dysfunction, although I suspect he may have some degree of peripheral neuropathy intermixed.  He may have cognitive deficits that are related to the bifrontal  dysfunction, and a brain MRI showed evidence of bifrontal injury, possibly dating to the time of the abscess.  Effects of total topiramate and clonazepam may exacerbate any underlying cognitive deficits.    The patient strongly wishes to continue the current regimen.  He did agree with obtaining blood levels today and also a current electroencephalogram.  Depending on the findings, he might be willing to discuss changes in antiseizure medications.  We also should review reasons for neuropsychological testing and evaluation of possible neuropathy.    I reviewed [place] regulations on seizures and driving with the patient. He appeared to clearly understand that he is prohibited from operating a motor vehicle within 3 months following any seizure or other episode with sudden unconsciousness or inability to sit up, and that he is required to report any future such seizure to the [facility] within 30 days after the event.    PLAN:  1)  Continue lamotrigine, topiramate and clonazepam at the current doses.  2)  Check lamotrigine, topiramate and clonazepam blood levels.  3)  Outpatient 3-hour video electroencephalogram.  4)  Return visit in approximately 4 months.    I spent 65 minutes in this patient care, with 43 minutes in direct patient contact, and 22 minutes in chart review and document preparation on the day of the visit.      Amari Gaffney M.D.  Professor of Neurology

## 2025-04-10 NOTE — LETTER
4/10/2025       RE: Ronny Rinaldi  : 1940   MRN: [id number]      Dear Colleague,    Thank you for referring your patient, Ronny Rinaldi, to the [facility]PI[facility] at [facility]. Please see a copy of my visit note below.      [facility]: RETURN VISIT         Service Date: 04/10/2025    HISTORY: Mr. Ronny Rinaldi is an 85-year-old, right-handed man who returned for follow-up of focal epilepsy.  He came with his wife to the visit today.  The patient previously was followed by Dr. Yarelis Cain.    Following the most recent visit with Dr. Cain on 2024, the patient reportedly has had no seizures and no medication adverse effects.      He is quite concerned that none of his antiseizure medications would be reduced.  He recalls that his most recent seizure was a grand mal seizure in 2008, which occurred when one of the medications was being reduced.  He has been on the same antiseizure medication regimen at the same doses since that time.    Ictal semiology-history:  Seizures began in , and most recently occurred in , according to physician progress notes, or in , according ot the patient.    Seizure type 1: Generalized tonic-clonic seizure.  The patient recalls that when grand mal seizures began when he while awake, his aura occurred briefly.  His wife recalls seeing events with generalized stiffness and bilateral jerking with unresponsiveness lasting a minute or 2, followed by severe confusion.  They estimaate that 20-25 grand mal seizures occurred between  and  (or ).    Seizure type 2: Focal impaired seizure.  Old medical records list a history of complex partial seizures, but the patient and his wife do not recall anything about nonconvulsive seizures with impaired awareness.    Seizure type 3: Isolated aura.  The patient recalls that at times he had isolated auras consisting of sudden onset of  "a strange feeling that he could not further describe lasting perhaps a few seconds, and stereotyped from 1 event to the next.  He does not think that this occurred after .      Epilepsy-seizure predispositions:  The patient has no family history of epilepsy or seizures.    A left frontal abscess was diagnosed in , and onset of seizures was in .  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  He underwent a hue hole with drainage followed by antibiotic therapy.  Six months after the abscess was diagnosed he began having seizures.      He has no history of gestational or  injury, febrile convulsions, developmental delay, stroke, meningitis,  encephalitis, significant head injury, or other epileptic predispositions.    Laboratory evaluations:  A brain MRI at [facility], on 2021, was reported to show:   1. No acute intracranial abnormality.  2. Remote insults involving the left greater than right frontal lobes with associated remote hemorrhage involving the left frontal lobe.  3. Remote blood probably associated with a prior right convexity extra-axial hemorrhage.  4. Senescent changes of global cerebral volume loss and chronic microvascular ischemia.\"    Outpatient video electroencephalography at [facility], on 2001, was reported to show sharp and slow waves independently in the left frontal and right temporal areas.     Epilepsy therapeutics:  The patient initially was treated with phenytoin and later with combinations of carbamazepine, divalproex and gabapentin.  He became seizure-free on lamotrigine, topiramate and clonazepam.    PAST MEDICAL-SURGICAL HISTORY:  1. Focal epilepsy.  2. Status post drainage of left frontal lobe abscess ().  3. Sick sinus syndrome and atrial fibrillation; status post pacemaker placement; chronic anticoagulation.    FAMILY HISTORY: There is no family history of seizures or epilepsy, or of other " neurological conditions.    PERSONAL AND SOCIAL HISTORY: The patient grew up in [place].  He obtained a bachelor's degree in electrical engineering.  He worked in [employer] science, until he retired at about 56 years of age with disability.  He lives with his wife.    REVIEW OF SYSTEMS: His wife noted that he has some difficulties with slowed thinking and poor memory.    MEDICATIONS: Lamotrigine 200/250 mg daily, topiramate 150 mg b.I.d., clonazepam 0/5/1.0 mg daily, apixaban, and other medications as per the electronic medical record.    PHYSICAL EXAMINATION:  On physical examination the patient appeared well nourished and in no acute distress. Vital signs were as per the electronic medical record.  Skull was consistent with reported surgery. Neck was supple, without signs of meningeal irritation.  On neurological examination, the patient appeared alert and was fully oriented to person, place, time, and reason for visit.  Speech showed normal articulation, fluency, repetitions, naming, syntax and comprehension.  Cranial nerves III through XII were normal.  Muscle masses, tones and strengths were normal throughout. There was no pronator drift.   No spontaneous tremors, myoclonus, or other abnormal movements were observed.  Sensations of light touch were reportedly normal throughout, except reduced over the feet in shifting zones.  Responses to vibratory testing were inconsistent at the ankles and feet,   The finger-nose-finger were performed normally bilaterally.  Romberg maneuver was positive, with swaying.  Regular walking was wide-based and slow, with decreased arm swing.  Deep tendon reflexes were normal, except reduced at the Achilles tendons. Toes were downgoing bilaterally.    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  Seizures have been fully controlled for longer than 15 years on a 3 drug  regimen that reportedly causes no adverse effects.    The patient has some gait abnormalities that might be caused by frontal lobe dysfunction, although I suspect he may have some degree of peripheral neuropathy intermixed.  He may have cognitive deficits that are related to the bifrontal dysfunction, and a brain MRI showed evidence of bifrontal injury, possibly dating to the time of the abscess.  Effects of total topiramate and clonazepam may exacerbate any underlying cognitive deficits.    The patient strongly wishes to continue the current regimen.  He did agree with obtaining blood levels today and also a current electroencephalogram.  Depending on the findings, he might be willing to discuss changes in antiseizure medications.  We also should review reasons for neuropsychological testing and evaluation of possible neuropathy.    I reviewed [place] regulations on seizures and driving with the patient. He appeared to clearly understand that he is prohibited from operating a motor vehicle within 3 months following any seizure or other episode with sudden unconsciousness or inability to sit up, and that he is required to report any future such seizure to the [facility] within 30 days after the event.    PLAN:  1)  Continue lamotrigine, topiramate and clonazepam at the current doses.  2)  Check lamotrigine, topiramate and clonazepam blood levels.  3)  Outpatient 3-hour video electroencephalogram.  4)  Return visit in approximately 4 months.    I spent 65 minutes in this patient care, with 43 minutes in direct patient contact, and 22 minutes in chart review and document preparation on the day of the visit.      Amari Gaffney M.D.  Professor of Neurology      Again, thank you for allowing me to participate in the care of your patient.      Sincerely,    Amari Gaffney MD